# Patient Record
Sex: FEMALE | Race: OTHER | HISPANIC OR LATINO
[De-identification: names, ages, dates, MRNs, and addresses within clinical notes are randomized per-mention and may not be internally consistent; named-entity substitution may affect disease eponyms.]

---

## 2017-08-17 ENCOUNTER — LABORATORY RESULT (OUTPATIENT)
Age: 32
End: 2017-08-17

## 2017-08-17 ENCOUNTER — OUTPATIENT (OUTPATIENT)
Dept: OUTPATIENT SERVICES | Facility: HOSPITAL | Age: 32
LOS: 1 days | End: 2017-08-17

## 2017-08-17 ENCOUNTER — APPOINTMENT (OUTPATIENT)
Dept: INTERNAL MEDICINE | Facility: HOSPITAL | Age: 32
End: 2017-08-17

## 2017-08-17 VITALS
SYSTOLIC BLOOD PRESSURE: 126 MMHG | WEIGHT: 186 LBS | BODY MASS INDEX: 36.52 KG/M2 | HEIGHT: 60 IN | DIASTOLIC BLOOD PRESSURE: 75 MMHG | HEART RATE: 78 BPM

## 2017-08-17 DIAGNOSIS — N92.1 EXCESSIVE AND FREQUENT MENSTRUATION WITH IRREGULAR CYCLE: ICD-10-CM

## 2017-08-17 DIAGNOSIS — Z87.898 PERSONAL HISTORY OF OTHER SPECIFIED CONDITIONS: ICD-10-CM

## 2017-08-17 DIAGNOSIS — D50.0 IRON DEFICIENCY ANEMIA SECONDARY TO BLOOD LOSS (CHRONIC): ICD-10-CM

## 2017-08-17 DIAGNOSIS — N80.0 ENDOMETRIOSIS OF UTERUS: ICD-10-CM

## 2017-08-17 DIAGNOSIS — N39.8 OTHER SPECIFIED DISORDERS OF URINARY SYSTEM: ICD-10-CM

## 2017-08-17 DIAGNOSIS — K59.00 CONSTIPATION, UNSPECIFIED: ICD-10-CM

## 2017-08-17 DIAGNOSIS — N39.3 STRESS INCONTINENCE (FEMALE) (MALE): ICD-10-CM

## 2017-08-17 DIAGNOSIS — Z01.419 ENCOUNTER FOR GYNECOLOGICAL EXAMINATION (GENERAL) (ROUTINE) W/OUT ABNORMAL FINDINGS: ICD-10-CM

## 2017-08-17 DIAGNOSIS — F41.9 ANXIETY DISORDER, UNSPECIFIED: ICD-10-CM

## 2017-08-17 DIAGNOSIS — Z87.440 PERSONAL HISTORY OF URINARY (TRACT) INFECTIONS: ICD-10-CM

## 2017-08-17 DIAGNOSIS — E66.9 OBESITY, UNSPECIFIED: ICD-10-CM

## 2017-08-17 LAB
ALBUMIN SERPL ELPH-MCNC: 4.3 G/DL — SIGNIFICANT CHANGE UP (ref 3.3–5)
ALP SERPL-CCNC: 49 U/L — SIGNIFICANT CHANGE UP (ref 40–120)
ALT FLD-CCNC: 16 U/L — SIGNIFICANT CHANGE UP (ref 4–33)
AST SERPL-CCNC: 14 U/L — SIGNIFICANT CHANGE UP (ref 4–32)
BASOPHILS # BLD AUTO: 0.05 K/UL — SIGNIFICANT CHANGE UP (ref 0–0.2)
BASOPHILS NFR BLD AUTO: 0.9 % — SIGNIFICANT CHANGE UP (ref 0–2)
BILIRUB SERPL-MCNC: < 0.2 MG/DL — LOW (ref 0.2–1.2)
BUN SERPL-MCNC: 20 MG/DL — SIGNIFICANT CHANGE UP (ref 7–23)
CALCIUM SERPL-MCNC: 8.9 MG/DL — SIGNIFICANT CHANGE UP (ref 8.4–10.5)
CHLORIDE SERPL-SCNC: 105 MMOL/L — SIGNIFICANT CHANGE UP (ref 98–107)
CHOLEST SERPL-MCNC: 191 MG/DL — SIGNIFICANT CHANGE UP (ref 120–199)
CO2 SERPL-SCNC: 24 MMOL/L — SIGNIFICANT CHANGE UP (ref 22–31)
CREAT SERPL-MCNC: 0.87 MG/DL — SIGNIFICANT CHANGE UP (ref 0.5–1.3)
EOSINOPHIL # BLD AUTO: 0.13 K/UL — SIGNIFICANT CHANGE UP (ref 0–0.5)
EOSINOPHIL NFR BLD AUTO: 2.4 % — SIGNIFICANT CHANGE UP (ref 0–6)
FERRITIN SERPL-MCNC: 6.09 NG/ML — LOW (ref 15–150)
GLUCOSE SERPL-MCNC: 96 MG/DL — SIGNIFICANT CHANGE UP (ref 70–99)
HBA1C BLD-MCNC: 6.1 % — HIGH (ref 4–5.6)
HCT VFR BLD CALC: 35.3 % — SIGNIFICANT CHANGE UP (ref 34.5–45)
HDLC SERPL-MCNC: 56 MG/DL — SIGNIFICANT CHANGE UP (ref 45–65)
HGB BLD-MCNC: 11.2 G/DL — LOW (ref 11.5–15.5)
IMM GRANULOCYTES # BLD AUTO: 0.03 # — SIGNIFICANT CHANGE UP
IMM GRANULOCYTES NFR BLD AUTO: 0.5 % — SIGNIFICANT CHANGE UP (ref 0–1.5)
LIPID PNL WITH DIRECT LDL SERPL: 127 MG/DL — SIGNIFICANT CHANGE UP
LYMPHOCYTES # BLD AUTO: 2.36 K/UL — SIGNIFICANT CHANGE UP (ref 1–3.3)
LYMPHOCYTES # BLD AUTO: 43.2 % — SIGNIFICANT CHANGE UP (ref 13–44)
MCHC RBC-ENTMCNC: 25.5 PG — LOW (ref 27–34)
MCHC RBC-ENTMCNC: 31.7 % — LOW (ref 32–36)
MCV RBC AUTO: 80.2 FL — SIGNIFICANT CHANGE UP (ref 80–100)
MONOCYTES # BLD AUTO: 0.66 K/UL — SIGNIFICANT CHANGE UP (ref 0–0.9)
MONOCYTES NFR BLD AUTO: 12.1 % — SIGNIFICANT CHANGE UP (ref 2–14)
NEUTROPHILS # BLD AUTO: 2.23 K/UL — SIGNIFICANT CHANGE UP (ref 1.8–7.4)
NEUTROPHILS NFR BLD AUTO: 40.9 % — LOW (ref 43–77)
NRBC # FLD: 0 — SIGNIFICANT CHANGE UP
PLATELET # BLD AUTO: 368 K/UL — SIGNIFICANT CHANGE UP (ref 150–400)
PMV BLD: 9.6 FL — SIGNIFICANT CHANGE UP (ref 7–13)
POTASSIUM SERPL-MCNC: 4.6 MMOL/L — SIGNIFICANT CHANGE UP (ref 3.5–5.3)
POTASSIUM SERPL-SCNC: 4.6 MMOL/L — SIGNIFICANT CHANGE UP (ref 3.5–5.3)
PROT SERPL-MCNC: 7.2 G/DL — SIGNIFICANT CHANGE UP (ref 6–8.3)
RBC # BLD: 4.4 M/UL — SIGNIFICANT CHANGE UP (ref 3.8–5.2)
RBC # FLD: 15.8 % — HIGH (ref 10.3–14.5)
SODIUM SERPL-SCNC: 142 MMOL/L — SIGNIFICANT CHANGE UP (ref 135–145)
TRIGL SERPL-MCNC: 90 MG/DL — SIGNIFICANT CHANGE UP (ref 10–149)
WBC # BLD: 5.46 K/UL — SIGNIFICANT CHANGE UP (ref 3.8–10.5)
WBC # FLD AUTO: 5.46 K/UL — SIGNIFICANT CHANGE UP (ref 3.8–10.5)

## 2017-08-18 DIAGNOSIS — N39.41 URGE INCONTINENCE: ICD-10-CM

## 2017-08-18 DIAGNOSIS — E66.9 OBESITY, UNSPECIFIED: ICD-10-CM

## 2017-08-18 DIAGNOSIS — F41.9 ANXIETY DISORDER, UNSPECIFIED: ICD-10-CM

## 2017-08-18 DIAGNOSIS — N80.0 ENDOMETRIOSIS OF UTERUS: ICD-10-CM

## 2017-08-18 DIAGNOSIS — N92.1 EXCESSIVE AND FREQUENT MENSTRUATION WITH IRREGULAR CYCLE: ICD-10-CM

## 2017-08-18 DIAGNOSIS — D50.0 IRON DEFICIENCY ANEMIA SECONDARY TO BLOOD LOSS (CHRONIC): ICD-10-CM

## 2017-08-18 DIAGNOSIS — K59.00 CONSTIPATION, UNSPECIFIED: ICD-10-CM

## 2017-09-07 ENCOUNTER — APPOINTMENT (OUTPATIENT)
Dept: UROGYNECOLOGY | Facility: CLINIC | Age: 32
End: 2017-09-07

## 2019-04-01 ENCOUNTER — OUTPATIENT (OUTPATIENT)
Dept: OUTPATIENT SERVICES | Facility: HOSPITAL | Age: 34
LOS: 1 days | End: 2019-04-01
Payer: MEDICAID

## 2019-04-01 PROCEDURE — G9001: CPT

## 2019-04-15 DIAGNOSIS — Z71.89 OTHER SPECIFIED COUNSELING: ICD-10-CM

## 2019-04-17 ENCOUNTER — APPOINTMENT (OUTPATIENT)
Dept: UROGYNECOLOGY | Facility: CLINIC | Age: 34
End: 2019-04-17
Payer: MEDICAID

## 2019-04-17 DIAGNOSIS — R39.13 SPLITTING OF URINARY STREAM: ICD-10-CM

## 2019-04-17 DIAGNOSIS — N39.46 MIXED INCONTINENCE: ICD-10-CM

## 2019-04-17 DIAGNOSIS — R35.0 FREQUENCY OF MICTURITION: ICD-10-CM

## 2019-04-17 PROCEDURE — 51725 SIMPLE CYSTOMETROGRAM: CPT

## 2019-04-17 PROCEDURE — 51741 ELECTRO-UROFLOWMETRY FIRST: CPT

## 2019-04-17 PROCEDURE — 99214 OFFICE O/P EST MOD 30 MIN: CPT | Mod: 25

## 2019-04-17 NOTE — HISTORY OF PRESENT ILLNESS
[FreeTextEntry1] : 33 year old s/p c/sectiontriplets 9 years ago, [place] , s/p RP sling with Dr Junior with mixed incontinence, voiding dysfunction, and  prolapse.  Desires reconstructive surgery.

## 2019-04-17 NOTE — ASSESSMENT
[FreeTextEntry1] : 33 year old s/p c/section triplets 9 years ago, [place] , s/p RP sling with Dr Junior with mixed incontinence, voiding dysfunction, and  prolapse.  Desires reconstructive surgery.\par \par Sigificant mixed incontinence.  UDS indicated with void dysfunction and three failed incontinence procedures.\par \par counseled regarding surgical options.\par \par Patient presents for preoperative counseling and decision for surgery. We reviewed management options for her prolapse including: observation, pelvic floor exercises, pessary, surgical management. We reviewed various surgical management options including vaginal, laparoscopic/robotic, abdominal procedures. We also reviewed both mesh and non-mesh options. She continues to be interested in abdominal supracervical hysterectomy with retention of cervix and ovaries\par \par uds, cysto, preop pensing. sacral colpopexy. We discussed her urodynamics findings regarding incontinence, potential incontinence and risk and benefits of a sling procedure as well as other options.  We reviewed the data related to the C.A.R.E. study as it pertains to education about an incontinence procedure in conjunction with her prolapse procedure.  \par \par She   desires midurethral sling at the time of her prolapse repair but may choose to stage procedure given 3 previous repairs. \par \par She denies a history of abnormal Pap smears in the past and a recent Pap smear is negative. She is aware that her cervix will be left in situ and she will require continued routine screening. \par \par  She would like to proceed with a bilateral salpingectomy at the time of the procedure. we reviewed risks of the surgery including but not limited to: bleeding, infection, pain, urinary retention, persistence or worsening of stress urinary incontinence, development of urge incontinence, voiding dysfunction, mesh erosion, failure to reduce prolapse, prolapse recurrence, dyspareunia, and vaginal shortening. we discussed the risk of laparotomy (having to make an incision). We discussed the risk of injury to her bladder, ureters, urethra, vagina, rectum and bowel. We discussed the risk of mesh erosion, fistula formation and neuropathy. The risks and procedure details were explained in layman's terms and she expressed understanding of all of these risks. She understands that mesh will be used and we reviewed the FDA notification on transvaginal mesh. She understands that reoperation may be necessary for mesh related problems or non mesh related problems. \par \par We discussed the elective nature of the surgery and we discussed that she is choosing to undergo this surgery despite awareness and understanding of procedure risks. She was counseled on alternative treatment options. She continues to desire procedure. \par \par  We reviewed her hospital stay as well as preoperative and postoperative instructions. She is aware that she must be NPO after midnight prior to the surgery. \par \par

## 2019-04-19 ENCOUNTER — OUTPATIENT (OUTPATIENT)
Dept: OUTPATIENT SERVICES | Facility: HOSPITAL | Age: 34
LOS: 1 days | End: 2019-04-19
Payer: MEDICAID

## 2019-04-19 ENCOUNTER — APPOINTMENT (OUTPATIENT)
Dept: UROGYNECOLOGY | Facility: CLINIC | Age: 34
End: 2019-04-19
Payer: MEDICAID

## 2019-04-19 VITALS
HEIGHT: 60 IN | RESPIRATION RATE: 18 BRPM | TEMPERATURE: 99 F | OXYGEN SATURATION: 80 % | WEIGHT: 173.94 LBS | DIASTOLIC BLOOD PRESSURE: 69 MMHG | SYSTOLIC BLOOD PRESSURE: 103 MMHG | HEART RATE: 80 BPM

## 2019-04-19 DIAGNOSIS — N39.41 URGE INCONTINENCE: ICD-10-CM

## 2019-04-19 DIAGNOSIS — Z01.818 ENCOUNTER FOR OTHER PREPROCEDURAL EXAMINATION: ICD-10-CM

## 2019-04-19 DIAGNOSIS — N39.3 STRESS INCONTINENCE (FEMALE) (MALE): ICD-10-CM

## 2019-04-19 DIAGNOSIS — Z29.9 ENCOUNTER FOR PROPHYLACTIC MEASURES, UNSPECIFIED: ICD-10-CM

## 2019-04-19 DIAGNOSIS — N81.3 COMPLETE UTEROVAGINAL PROLAPSE: ICD-10-CM

## 2019-04-19 DIAGNOSIS — N81.2 INCOMPLETE UTEROVAGINAL PROLAPSE: ICD-10-CM

## 2019-04-19 DIAGNOSIS — R35.0 FREQUENCY OF MICTURITION: ICD-10-CM

## 2019-04-19 DIAGNOSIS — N81.11 CYSTOCELE, MIDLINE: ICD-10-CM

## 2019-04-19 LAB
ANION GAP SERPL CALC-SCNC: 12 MMOL/L — SIGNIFICANT CHANGE UP (ref 5–17)
BLD GP AB SCN SERPL QL: NEGATIVE — SIGNIFICANT CHANGE UP
BUN SERPL-MCNC: 16 MG/DL — SIGNIFICANT CHANGE UP (ref 7–23)
CALCIUM SERPL-MCNC: 8.7 MG/DL — SIGNIFICANT CHANGE UP (ref 8.4–10.5)
CHLORIDE SERPL-SCNC: 108 MMOL/L — SIGNIFICANT CHANGE UP (ref 96–108)
CO2 SERPL-SCNC: 21 MMOL/L — LOW (ref 22–31)
CREAT SERPL-MCNC: 0.68 MG/DL — SIGNIFICANT CHANGE UP (ref 0.5–1.3)
GLUCOSE SERPL-MCNC: 81 MG/DL — SIGNIFICANT CHANGE UP (ref 70–99)
POTASSIUM SERPL-MCNC: 3.9 MMOL/L — SIGNIFICANT CHANGE UP (ref 3.5–5.3)
POTASSIUM SERPL-SCNC: 3.9 MMOL/L — SIGNIFICANT CHANGE UP (ref 3.5–5.3)
RH IG SCN BLD-IMP: POSITIVE — SIGNIFICANT CHANGE UP
SODIUM SERPL-SCNC: 141 MMOL/L — SIGNIFICANT CHANGE UP (ref 135–145)

## 2019-04-19 PROCEDURE — 80048 BASIC METABOLIC PNL TOTAL CA: CPT

## 2019-04-19 PROCEDURE — G0463: CPT

## 2019-04-19 PROCEDURE — 86901 BLOOD TYPING SEROLOGIC RH(D): CPT

## 2019-04-19 PROCEDURE — 85027 COMPLETE CBC AUTOMATED: CPT

## 2019-04-19 PROCEDURE — 51797 INTRAABDOMINAL PRESSURE TEST: CPT | Mod: 26

## 2019-04-19 PROCEDURE — 83036 HEMOGLOBIN GLYCOSYLATED A1C: CPT

## 2019-04-19 PROCEDURE — 86850 RBC ANTIBODY SCREEN: CPT

## 2019-04-19 PROCEDURE — 51729 CYSTOMETROGRAM W/VP&UP: CPT

## 2019-04-19 PROCEDURE — 51729 CYSTOMETROGRAM W/VP&UP: CPT | Mod: 26

## 2019-04-19 PROCEDURE — 51784 ANAL/URINARY MUSCLE STUDY: CPT | Mod: 26

## 2019-04-19 PROCEDURE — 51797 INTRAABDOMINAL PRESSURE TEST: CPT

## 2019-04-19 PROCEDURE — 86900 BLOOD TYPING SEROLOGIC ABO: CPT

## 2019-04-19 PROCEDURE — 51784 ANAL/URINARY MUSCLE STUDY: CPT

## 2019-04-19 RX ORDER — GENTAMICIN SULFATE 40 MG/ML
390 VIAL (ML) INJECTION ONCE
Qty: 0 | Refills: 0 | Status: COMPLETED | OUTPATIENT
Start: 2019-04-29 | End: 2019-04-29

## 2019-04-19 RX ORDER — SODIUM CHLORIDE 9 MG/ML
3 INJECTION INTRAMUSCULAR; INTRAVENOUS; SUBCUTANEOUS EVERY 8 HOURS
Qty: 0 | Refills: 0 | Status: DISCONTINUED | OUTPATIENT
Start: 2019-04-29 | End: 2019-04-29

## 2019-04-19 RX ORDER — LIDOCAINE HCL 20 MG/ML
0.2 VIAL (ML) INJECTION ONCE
Qty: 0 | Refills: 0 | Status: DISCONTINUED | OUTPATIENT
Start: 2019-04-29 | End: 2019-04-29

## 2019-04-19 RX ORDER — CHLORHEXIDINE GLUCONATE 213 G/1000ML
1 SOLUTION TOPICAL DAILY
Qty: 0 | Refills: 0 | Status: DISCONTINUED | OUTPATIENT
Start: 2019-04-29 | End: 2019-04-29

## 2019-04-19 NOTE — H&P PST ADULT - ASSESSMENT
CAPRINI SCORE [CLOT updated 18]    AGE RELATED RISK FACTORS                                                       MOBILITY RELATED FACTORS  [ ] Age 41-60 years                                            (1 Point)                    [ ] Bed rest                                                        (1 Point)  [ ] Age: 61-74 years                                           (2 Points)                  [ ] Plaster cast                                                   (2 Points)  [ ] Age= 75 years                                              (3 Points)                    [ ] Bed bound for more than 72 hours                 (2 Points)    DISEASE RELATED RISK FACTORS                                               GENDER SPECIFIC FACTORS  [ ] Edema in the lower extremities                       (1 Point)              [ ] Pregnancy                                                     (1 Point)  [ ] Varicose veins                                               (1 Point)                     [ ] Post-partum < 6 weeks                                   (1 Point)             [x ] BMI > 25 Kg/m2                                            (1 Point)                     [ ] Hormonal therapy  or oral contraception          (1 Point)                 [ ] Sepsis (in the previous month)                        (1 Point)               [ ] History of pregnancy complications                 (1 point)  [ ] Pneumonia or serious lung disease                                               [ ] Unexplained or recurrent                     (1 Point)           (in the previous month)                               (1 Point)  [ ] Abnormal pulmonary function test                     (1 Point)                 SURGERY RELATED RISK FACTORS  [ ] Acute myocardial infarction                              (1 Point)               [ ]  Section                                             (1 Point)  [ ] Congestive heart failure (in the previous month)  (1 Point)      [ ] Minor surgery                                                  (1 Point)   [ ] Inflammatory bowel disease                             (1 Point)               [ ] Arthroscopic surgery                                        (2 Points)  [ ] Central venous access                                      (2 Points)                [ x] General surgery lasting more than 45 minutes (2 points)  [ ] Present or previous malignancy                     (2 Points)                [ ] Elective arthroplasty                                         (5 points)    [ ] Stroke (in the previous month)                          (5 Points)                                                                                                                                                           HEMATOLOGY RELATED FACTORS                                                 TRAUMA RELATED RISK FACTORS  [ ] Prior episodes of VTE                                     (3 Points)                [ ] Fracture of the hip, pelvis, or leg                       (5 Points)  [ ] Positive family history for VTE                         (3 Points)             [ ] Acute spinal cord injury (in the previous month)  (5 Points)  [ ] Prothrombin 81355 A                                     (3 Points)               [ ] Paralysis  (less than 1 month)                             (5 Points)  [ ] Factor V Leiden                                             (3 Points)                  [ ] Multiple Trauma within 1 month                        (5 Points)  [ ] Lupus anticoagulants                                     (3 Points)                                                           [ ] Anticardiolipin antibodies                               (3 Points)                                                       [ ] High homocysteine in the blood                      (3 Points)                                             [ ] Other congenital or acquired thrombophilia      (3 Points)                                                [ ] Heparin induced thrombocytopenia                  (3 Points)                                     Total Score [ 3         ]

## 2019-04-19 NOTE — H&P PST ADULT - NSICDXPASTSURGICALHX_GEN_ALL_CORE_FT
PAST SURGICAL HISTORY:  C Section 12/9/09 with triplets    History of Appendectomy (ICD9 V45.89) 2007    History of Prior Ablation Treatment (ICD9 V15.29) 2007    Normal vaginal delivery 2001    S/P Sling Procedure as per pt, she had a "bladder sling" placed in [place], 2014. [facility]- 2012    S/P tubal ligation 3/2011

## 2019-04-19 NOTE — H&P PST ADULT - NSICDXPASTMEDICALHX_GEN_ALL_CORE_FT
PAST MEDICAL HISTORY:  Adenomyosis     h/o  Nephritis last hospitalization      (normal spontaneous vaginal delivery) x 2 - ,     Obesity (BMI 30-39.9)     Personal History of Cardiac Arrhythmia 2007 - s/p ablation    Stress incontinence, female

## 2019-04-19 NOTE — H&P PST ADULT - HISTORY OF PRESENT ILLNESS
33 year old  female , triplets () with h/o urinary incontinence since the birth of her first child (), s/p incontinence surgery x 3 (  and  in [facility] [place] and  at [facility] . Pt has been experiencing increased urinary frequency, abnormal vaginal bleeding with irregular menstrual cycle, vaginal bulging followed by GYN . Presents to PST for scheduled  Midurethral sling, Cystoscopy , Abdominal Sacral  Colpopexy, Supra Cervical Hysterectomy , Anterior Colporrhaphy on 2019.

## 2019-04-19 NOTE — H&P PST ADULT - NSANTHOSAYNRD_GEN_A_CORE
No. DEB screening performed.  STOP BANG Legend: 0-2 = LOW Risk; 3-4 = INTERMEDIATE Risk; 5-8 = HIGH Risk

## 2019-04-19 NOTE — H&P PST ADULT - NSICDXPROBLEM_GEN_ALL_CORE_FT
PROBLEM DIAGNOSES  Problem: Complete uterovaginal prolapse  Assessment and Plan:  Midurethral sling, Cystoscopy ,Abdominal Sacral  Colpopexy ,Supra Cervical Hysterectomy , Anterior Colporrhaphy on 4/29/2019.   Pre- Op instructions discussed   CBC,BMP,Type and Screen ,HgA1c sent      Problem: Prophylactic measure  Assessment and Plan: The Caprini score indicates this patient is at risk for a VTE event (score 3-5).  Most surgical patients in this group would benefit from pharmacologic prophylaxis.  The surgical team will determine the balance between VTE risk and bleeding risk

## 2019-04-19 NOTE — H&P PST ADULT - ATTENDING COMMENTS
Patient with advanced pelvic prolapse admitted for hysterectomy, abdominal sacral suspension, and bilateral removal of fallopian tubes.  She had had three previous incontinence procedures and although a degree of incontinence persists, we discussed the risks of scar tissue, retropubic adhesions, organ injury, fistula formation with additional bladder surgery and she elects to stage the procedure with regard to any incontinence procedure. She understands the incontinence may be exacerbated.  We discussed the option of a rehman procedure and she will allow discretion of possible performance of a Rehman if the retropubic space is deemed without significant adhesions Patient with advanced pelvic prolapse admitted for hysterectomy, abdominal sacral suspension, and bilateral removal of fallopian tubes.  She had had three previous incontinence procedures and although a degree of incontinence persists, we discussed the risks of scar tissue, retropubic adhesions, organ injury, fistula formation with additional bladder surgery and she elects to stage the procedure with regard to any incontinence procedure. She understands the incontinence may be exacerbated.  We discussed the option of a rehman procedure and she will allow discretion of possible performance of a Rehman if the retropubic space is deemed without significant adhesions. Risks and benefits reviewed.    She had a positive cutute 5 days ago and was treated 3 days with appropriate antibiotic

## 2019-04-20 LAB
HBA1C BLD-MCNC: 5.4 % — SIGNIFICANT CHANGE UP (ref 4–5.6)
HCT VFR BLD CALC: 34.6 % — SIGNIFICANT CHANGE UP (ref 34.5–45)
HGB BLD-MCNC: 10.4 G/DL — LOW (ref 11.5–15.5)
MCHC RBC-ENTMCNC: 24.1 PG — LOW (ref 27–34)
MCHC RBC-ENTMCNC: 30.1 GM/DL — LOW (ref 32–36)
MCV RBC AUTO: 80.1 FL — SIGNIFICANT CHANGE UP (ref 80–100)
PLATELET # BLD AUTO: 385 K/UL — SIGNIFICANT CHANGE UP (ref 150–400)
RBC # BLD: 4.32 M/UL — SIGNIFICANT CHANGE UP (ref 3.8–5.2)
RBC # FLD: 16.1 % — HIGH (ref 10.3–14.5)
WBC # BLD: 5.28 K/UL — SIGNIFICANT CHANGE UP (ref 3.8–10.5)
WBC # FLD AUTO: 5.28 K/UL — SIGNIFICANT CHANGE UP (ref 3.8–10.5)

## 2019-04-22 DIAGNOSIS — R35.0 FREQUENCY OF MICTURITION: ICD-10-CM

## 2019-04-22 DIAGNOSIS — N39.41 URGE INCONTINENCE: ICD-10-CM

## 2019-04-22 DIAGNOSIS — N39.3 STRESS INCONTINENCE (FEMALE) (MALE): ICD-10-CM

## 2019-04-23 ENCOUNTER — RESULT REVIEW (OUTPATIENT)
Age: 34
End: 2019-04-23

## 2019-04-23 LAB — CYTOLOGY CVX/VAG DOC THIN PREP: NORMAL

## 2019-04-25 ENCOUNTER — OUTPATIENT (OUTPATIENT)
Dept: OUTPATIENT SERVICES | Facility: HOSPITAL | Age: 34
LOS: 1 days | End: 2019-04-25

## 2019-04-25 ENCOUNTER — APPOINTMENT (OUTPATIENT)
Dept: UROGYNECOLOGY | Facility: CLINIC | Age: 34
End: 2019-04-25
Payer: MEDICAID

## 2019-04-25 VITALS
HEIGHT: 60 IN | DIASTOLIC BLOOD PRESSURE: 80 MMHG | TEMPERATURE: 97.6 F | BODY MASS INDEX: 36.52 KG/M2 | HEART RATE: 72 BPM | SYSTOLIC BLOOD PRESSURE: 122 MMHG | WEIGHT: 186 LBS

## 2019-04-25 PROCEDURE — 99214 OFFICE O/P EST MOD 30 MIN: CPT | Mod: 25

## 2019-04-25 PROCEDURE — 52000 CYSTOURETHROSCOPY: CPT

## 2019-04-25 NOTE — PROCEDURE
[Stress Incontinence] : stress incontinence [Straight Catheterization] : insertion of a straight catheter

## 2019-04-25 NOTE — HISTORY OF PRESENT ILLNESS
[FreeTextEntry1] : Dale is a 32 yo P5 who presents for follow-up of her prolapse and UI. She has an extensive history of pelvic reconstructive surgery including 3 prior incontinence procedures ([place] in [place]- ?type unknown?) and one retropubic sling by Dr. Junior in 2012. Of note, [place] she also reports "making her vagina smaller because it was too big." She did not bring operative reports for us to review today. \par \par PMH: AUB, Iron deficiency anemia, last Hct 34.6 on 4/20/19\par PSH: cardiac ablation, BTL, appendectomy, CS x1, 3 prior pelvic surgeries\par Allergies: Motrin and Advil and sulfas--> rash and swelling\par \par Pap- NILM 4/2019\par TVUS- 2016 RV uterus 8cm likely adenomyosis, nl ovaries

## 2019-04-25 NOTE — PHYSICAL EXAM
[Well Nourished] : ~L well nourished [Well developed] : well developed [Normal Appearance] : ~T the appearance of the neck was normal [Obese] : obese [Warm and Dry] : was warm and dry to touch [Normal Gait] : gait was normal [Labia Majora] : were normal [Labia Minora] : were normal [Pink Rugae] : pink rugae [No Bleeding] : there was no active vaginal bleeding [1] : 1 [Exam Deferred] : was deferred [Normal] : normal [Aa ____] : Aa [unfilled] [Ba ____] : Ba [unfilled] [C ____] : C [unfilled] [GH ____] : GH [unfilled] [TVL ____] : TVL  [unfilled] [PB ____] : PB [unfilled] [Bp ____] : Bp [unfilled] [Ap ____] : Ap [unfilled] [D ____] : D [unfilled] [Anxiety] : patient is not anxious [Cough] : no cough [No Edema] : ~T edema was present [Distended] : not distended [Tenderness] : ~T no ~M abdominal tenderness observed [Inguinal LAD] : no adenopathy was noted in the inguinal lymph nodes [FreeTextEntry4] : small vaginal introitus  [de-identified] : cervix hypermobile with valsalva

## 2019-04-25 NOTE — DISCUSSION/SUMMARY
[FreeTextEntry1] : Patient presents for preoperative counseling and decision for surgery. We reviewed management options for her prolapse including: observation, pelvic floor exercises, pessary, surgical management. We reviewed various surgical management options including vaginal, laparoscopic/robotic, abdominal procedures. We also reviewed both mesh and non-mesh options. She continues to be interested in abdominal supracervical hysterectomy with sacral colpopexy to address her prolapse and AUB symptoms. We discussed her urodynamics findings regarding incontinence, potential incontinence and risk and benefits of a sling procedure as well as other options. Given her UDS findings (VLPP 199cm at 750mL) and her history of 3 prior incontinence procedures we discussed the risks and benefits of a staged procedure. She understands that this surgery may worsen her leakage of urine and we reviewed the data related to the C.A.R.E. study as it pertains to education about an incontinence procedure in conjunction with her prolapse procedure. She again expressed desire for a staged procedure. \par \par She denies a history of abnormal Pap smears in the past and a recent Pap smear is negative. She is aware that her cervix will be left in situ and she will require continued routine screening. We discussed the risks and benefits of removing her fallopian tubes at the time of surgery and she desires a bilateral salpingectomy. \par \par We reviewed risks of the surgery including but not limited to: bleeding, infection, pain, urinary retention, persistence or worsening of stress urinary incontinence, development of urge incontinence, voiding dysfunction, mesh erosion, failure to reduce prolapse, prolapse recurrence, dyspareunia, and vaginal shortening. we discussed the risk of laparotomy (having to make an incision). We discussed the risk of injury to her bladder, ureters, urethra, vagina, rectum and bowel. We discussed the risk of mesh erosion, fistula formation and neuropathy. The risks and procedure details were explained in layman's terms and she expressed understanding of all of these risks. She understands that mesh will be used and we reviewed the FDA notification on transvaginal mesh. She understands that reoperation may be necessary for mesh related problems or non mesh related problems. \par \par We discussed the elective nature of the surgery and we discussed that she is choosing to undergo this surgery despite awareness and understanding of procedure risks. She was counseled on alternative treatment options. She continues to desire procedure. \par \par We reviewed her hospital stay as well as preoperative and postoperative instructions. Written instructions were also provided to the patient for review at home. She is aware that she must be NPO after midnight prior to the surgery. We discussed the possibility of going home with a catheter. Hospital stay, postoperative restrictions, and anesthesia were discussed. All questions were answered. \par \par Attending: I participated in exam and key portions of counselling and agree with documentation.  She understands incontinence may be improved or worse following procedure\par \par

## 2019-04-26 ENCOUNTER — MESSAGE (OUTPATIENT)
Age: 34
End: 2019-04-26

## 2019-04-28 ENCOUNTER — TRANSCRIPTION ENCOUNTER (OUTPATIENT)
Age: 34
End: 2019-04-28

## 2019-04-29 ENCOUNTER — INPATIENT (INPATIENT)
Facility: HOSPITAL | Age: 34
LOS: 1 days | Discharge: HOME | DRG: 743 | End: 2019-05-01
Attending: UROLOGY | Admitting: UROLOGY
Payer: MEDICAID

## 2019-04-29 ENCOUNTER — RESULT REVIEW (OUTPATIENT)
Age: 34
End: 2019-04-29

## 2019-04-29 ENCOUNTER — TRANSCRIPTION ENCOUNTER (OUTPATIENT)
Age: 34
End: 2019-04-29

## 2019-04-29 ENCOUNTER — APPOINTMENT (OUTPATIENT)
Dept: UROGYNECOLOGY | Facility: HOSPITAL | Age: 34
End: 2019-04-29
Payer: MEDICAID

## 2019-04-29 VITALS
HEIGHT: 60 IN | DIASTOLIC BLOOD PRESSURE: 77 MMHG | TEMPERATURE: 98 F | HEART RATE: 68 BPM | OXYGEN SATURATION: 100 % | SYSTOLIC BLOOD PRESSURE: 115 MMHG | WEIGHT: 173.94 LBS | RESPIRATION RATE: 17 BRPM

## 2019-04-29 DIAGNOSIS — N39.3 STRESS INCONTINENCE (FEMALE) (MALE): ICD-10-CM

## 2019-04-29 DIAGNOSIS — N81.11 CYSTOCELE, MIDLINE: ICD-10-CM

## 2019-04-29 LAB
BACTERIA UR CULT: ABNORMAL
GLUCOSE BLDC GLUCOMTR-MCNC: 78 MG/DL — SIGNIFICANT CHANGE UP (ref 70–99)

## 2019-04-29 PROCEDURE — 88302 TISSUE EXAM BY PATHOLOGIST: CPT | Mod: 26

## 2019-04-29 PROCEDURE — 57280 SUSPENSION OF VAGINA: CPT

## 2019-04-29 PROCEDURE — 58180 PARTIAL HYSTERECTOMY: CPT

## 2019-04-29 PROCEDURE — 88307 TISSUE EXAM BY PATHOLOGIST: CPT | Mod: 26

## 2019-04-29 RX ORDER — OXYCODONE HYDROCHLORIDE 5 MG/1
5 TABLET ORAL EVERY 4 HOURS
Qty: 0 | Refills: 0 | Status: DISCONTINUED | OUTPATIENT
Start: 2019-04-29 | End: 2019-04-30

## 2019-04-29 RX ORDER — SODIUM CHLORIDE 9 MG/ML
1000 INJECTION, SOLUTION INTRAVENOUS
Qty: 0 | Refills: 0 | Status: DISCONTINUED | OUTPATIENT
Start: 2019-04-29 | End: 2019-05-01

## 2019-04-29 RX ORDER — ONDANSETRON 8 MG/1
4 TABLET, FILM COATED ORAL ONCE
Qty: 0 | Refills: 0 | Status: COMPLETED | OUTPATIENT
Start: 2019-04-29 | End: 2019-04-29

## 2019-04-29 RX ORDER — ACETAMINOPHEN 500 MG
1000 TABLET ORAL ONCE
Qty: 0 | Refills: 0 | Status: COMPLETED | OUTPATIENT
Start: 2019-04-29 | End: 2019-04-29

## 2019-04-29 RX ORDER — MORPHINE SULFATE 50 MG/1
4 CAPSULE, EXTENDED RELEASE ORAL EVERY 4 HOURS
Qty: 0 | Refills: 0 | Status: DISCONTINUED | OUTPATIENT
Start: 2019-04-29 | End: 2019-04-30

## 2019-04-29 RX ORDER — INFLUENZA VIRUS VACCINE 15; 15; 15; 15 UG/.5ML; UG/.5ML; UG/.5ML; UG/.5ML
0.5 SUSPENSION INTRAMUSCULAR ONCE
Qty: 0 | Refills: 0 | Status: DISCONTINUED | OUTPATIENT
Start: 2019-04-29 | End: 2019-05-01

## 2019-04-29 RX ORDER — HYDROMORPHONE HYDROCHLORIDE 2 MG/ML
0.5 INJECTION INTRAMUSCULAR; INTRAVENOUS; SUBCUTANEOUS
Qty: 0 | Refills: 0 | Status: DISCONTINUED | OUTPATIENT
Start: 2019-04-29 | End: 2019-04-29

## 2019-04-29 RX ORDER — VANCOMYCIN HCL 1 G
1000 VIAL (EA) INTRAVENOUS ONCE
Qty: 0 | Refills: 0 | Status: COMPLETED | OUTPATIENT
Start: 2019-04-29 | End: 2019-04-29

## 2019-04-29 RX ORDER — FAMOTIDINE 10 MG/ML
20 INJECTION INTRAVENOUS ONCE
Qty: 0 | Refills: 0 | Status: COMPLETED | OUTPATIENT
Start: 2019-04-29 | End: 2019-04-29

## 2019-04-29 RX ORDER — SODIUM CHLORIDE 9 MG/ML
1000 INJECTION, SOLUTION INTRAVENOUS
Qty: 0 | Refills: 0 | Status: DISCONTINUED | OUTPATIENT
Start: 2019-04-29 | End: 2019-04-29

## 2019-04-29 RX ORDER — OXYCODONE HYDROCHLORIDE 5 MG/1
10 TABLET ORAL EVERY 6 HOURS
Qty: 0 | Refills: 0 | Status: DISCONTINUED | OUTPATIENT
Start: 2019-04-29 | End: 2019-04-30

## 2019-04-29 RX ORDER — ACETAMINOPHEN 500 MG
1000 TABLET ORAL ONCE
Qty: 0 | Refills: 0 | Status: COMPLETED | OUTPATIENT
Start: 2019-04-30 | End: 2019-04-30

## 2019-04-29 RX ADMIN — Medication 400 MILLIGRAM(S): at 18:12

## 2019-04-29 RX ADMIN — FAMOTIDINE 20 MILLIGRAM(S): 10 INJECTION INTRAVENOUS at 06:21

## 2019-04-29 RX ADMIN — HYDROMORPHONE HYDROCHLORIDE 0.5 MILLIGRAM(S): 2 INJECTION INTRAMUSCULAR; INTRAVENOUS; SUBCUTANEOUS at 16:55

## 2019-04-29 RX ADMIN — SODIUM CHLORIDE 75 MILLILITER(S): 9 INJECTION, SOLUTION INTRAVENOUS at 13:59

## 2019-04-29 RX ADMIN — SODIUM CHLORIDE 3 MILLILITER(S): 9 INJECTION INTRAMUSCULAR; INTRAVENOUS; SUBCUTANEOUS at 15:05

## 2019-04-29 RX ADMIN — HYDROMORPHONE HYDROCHLORIDE 0.5 MILLIGRAM(S): 2 INJECTION INTRAMUSCULAR; INTRAVENOUS; SUBCUTANEOUS at 15:15

## 2019-04-29 RX ADMIN — CHLORHEXIDINE GLUCONATE 1 APPLICATION(S): 213 SOLUTION TOPICAL at 06:26

## 2019-04-29 RX ADMIN — Medication 1000 MILLIGRAM(S): at 18:37

## 2019-04-29 RX ADMIN — HYDROMORPHONE HYDROCHLORIDE 0.5 MILLIGRAM(S): 2 INJECTION INTRAMUSCULAR; INTRAVENOUS; SUBCUTANEOUS at 16:41

## 2019-04-29 RX ADMIN — HYDROMORPHONE HYDROCHLORIDE 0.5 MILLIGRAM(S): 2 INJECTION INTRAMUSCULAR; INTRAVENOUS; SUBCUTANEOUS at 15:00

## 2019-04-29 RX ADMIN — OXYCODONE HYDROCHLORIDE 10 MILLIGRAM(S): 5 TABLET ORAL at 22:44

## 2019-04-29 RX ADMIN — ONDANSETRON 4 MILLIGRAM(S): 8 TABLET, FILM COATED ORAL at 19:09

## 2019-04-29 RX ADMIN — OXYCODONE HYDROCHLORIDE 10 MILLIGRAM(S): 5 TABLET ORAL at 23:00

## 2019-04-29 NOTE — PROGRESS NOTE ADULT - SUBJECTIVE AND OBJECTIVE BOX
POST-OP CHECK    Allergies  ASA, PCN, SULFA, MOTRIN (Unknown)  aspirin (Angioedema; Rash)  Motrin (Angioedema)  penicillin (Rash; Anaphylaxis)  sulfa drugs (Pruritus; Rash)    Intolerances  Denies      S: Pt awake and alert resting c/o IV site pain. Patient due for pain medications. Pt denies N/V, SOB, CP, palpitations.    O:   HR: 66 (19 @ 15:30) (65 - 85)  BP: 125/70 (19 @ 15:30) (100/63 - 125/70)  RR: 18 (19 @ 15:30) (12 - 18)  SpO2: 96% (19 @ 15:30) (92% - 100%)    I&O's Summary    2019 07:  -  2019 16:42  --------------------------------------------------------  IN: 400 mL / OUT: 125 mL / NET: 275 mL        Gen:  CV: S1S2, RRR  Lungs: CTA B/L  Abd: soft, appropriately tender, occassional BS x 4 quadrants  Inc: Clean/dry/iintact  : thomas in place  Ext: PAS in place and functioning, Neg Homans B/L. no swelling, redness noted    A/P: 33y Female S/P DUSTIN, abdominal sacral colpopexy,  with PMHx of:  Adenomyosis  Obesity (BMI 30-39.9)   (normal spontaneous vaginal delivery): x 2 - ,   Stress incontinence, female  h/o  Nephritis: last hospitalization   Personal History of Cardiac Arrhythmia:  - s/p ablation  Normal vaginal delivery:   S/P Sling Procedure: as per pt, she had a &quot;bladder sling&quot; placed in [facility] [place] , . [facility]-   S/P tubal ligation: 3/2011  C Section: 09 with triplets  History of Prior Ablation Treatment (ICD9 V15.29):   History of Appendectomy (ICD9 V45.89):       -Continue routine care  -Analgesia PRN  -Pain management: oxycodone for moderate/severe pain, morphine for breakthrough pain  -[id number]'s/hr  -AM martha CHAND to remain to gravity  -AM CBC    Sharri Hassan PA-C

## 2019-04-29 NOTE — DISCHARGE NOTE PROVIDER - NSDCCPCAREPLAN_GEN_ALL_CORE_FT
PRINCIPAL DISCHARGE DIAGNOSIS  Diagnosis: Postoperative state  Assessment and Plan of Treatment: -s/p supracervical hysterectomy, abd sacrocolpoplexy  -stable condition, d/c home with f/u with Dr. Driscoll      SECONDARY DISCHARGE DIAGNOSES  Diagnosis: Complete uterovaginal prolapse  Assessment and Plan of Treatment:     Diagnosis: S/P abdominal supracervical subtotal hysterectomy  Assessment and Plan of Treatment:

## 2019-04-29 NOTE — DISCHARGE NOTE PROVIDER - HOSPITAL COURSE
Patient admitted for scheduled surgery. On 4/29 pt had abd sacrocolpopexy and supracervical hysterectomy. Operative note can be found in the chart. Hct: 34.6->    On POD#0 patient was transitioned to a regular diet and ambulated without difficulty. On POD#1 patient passed her TOV and was hemodynamically stable. Patient was discharged home on POD#1 with close follow up with Dr. Driscoll. Patient admitted for scheduled surgery. On 4/29 pt had abd sacrocolpopexy and supracervical hysterectomy. Operative note can be found in the chart. Hct: 34.6->29.8->26.9->27.8        On POD#0 patient was transitioned to a regular diet and ambulated without difficulty. On POD#2 patient passed her TOV and was hemodynamically stable. Patient was discharged home on POD#1 with close follow up with Dr. Driscoll. Patient admitted for scheduled surgery. On 4/29 pt had abd sacrocolpopexy and supracervical hysterectomy. Operative note can be found in the chart. Hct: 34.6->29.8->26.9->27.8        On POD#0 patient was transitioned to a regular diet and ambulated without difficulty. On POD#2 patient had abdominal pain and trouble passing flatus so abdominal xray was done and found to have normal gas pattern. Patient advised to tale dulcolax and simethicone.  On POD#2 patient passed her TOV and was hemodynamically stable. Patient was discharged home on POD#2 with close follow up with Dr. Driscoll.

## 2019-04-29 NOTE — DISCHARGE NOTE PROVIDER - CARE PROVIDER_API CALL
Wang Driscoll (MD)  Female Pelvic MedReconst Surg; Obstetrics and Gynecology  [address]  [place] [address]  Phone: [phone]  Fax: [phone]  Follow Up Time:

## 2019-04-30 DIAGNOSIS — Z98.890 OTHER SPECIFIED POSTPROCEDURAL STATES: ICD-10-CM

## 2019-04-30 LAB
BASOPHILS # BLD AUTO: 0 K/UL — SIGNIFICANT CHANGE UP (ref 0–0.2)
BASOPHILS NFR BLD AUTO: 0.4 % — SIGNIFICANT CHANGE UP (ref 0–2)
CULTURE RESULTS: NO GROWTH — SIGNIFICANT CHANGE UP
EOSINOPHIL # BLD AUTO: 0 K/UL — SIGNIFICANT CHANGE UP (ref 0–0.5)
EOSINOPHIL NFR BLD AUTO: 0.2 % — SIGNIFICANT CHANGE UP (ref 0–6)
HCT VFR BLD CALC: 29.8 % — LOW (ref 34.5–45)
HGB BLD-MCNC: 9.6 G/DL — LOW (ref 11.5–15.5)
LYMPHOCYTES # BLD AUTO: 18.6 % — SIGNIFICANT CHANGE UP (ref 13–44)
LYMPHOCYTES # BLD AUTO: 2.1 K/UL — SIGNIFICANT CHANGE UP (ref 1–3.3)
MCHC RBC-ENTMCNC: 24.9 PG — LOW (ref 27–34)
MCHC RBC-ENTMCNC: 32.3 GM/DL — SIGNIFICANT CHANGE UP (ref 32–36)
MCV RBC AUTO: 77.2 FL — LOW (ref 80–100)
MONOCYTES # BLD AUTO: 1.1 K/UL — HIGH (ref 0–0.9)
MONOCYTES NFR BLD AUTO: 10.2 % — SIGNIFICANT CHANGE UP (ref 2–14)
NEUTROPHILS # BLD AUTO: 7.9 K/UL — HIGH (ref 1.8–7.4)
NEUTROPHILS NFR BLD AUTO: 70.6 % — SIGNIFICANT CHANGE UP (ref 43–77)
PLATELET # BLD AUTO: 316 K/UL — SIGNIFICANT CHANGE UP (ref 150–400)
RBC # BLD: 3.86 M/UL — SIGNIFICANT CHANGE UP (ref 3.8–5.2)
RBC # FLD: 15.1 % — HIGH (ref 10.3–14.5)
SPECIMEN SOURCE: SIGNIFICANT CHANGE UP
WBC # BLD: 11.2 K/UL — HIGH (ref 3.8–10.5)
WBC # FLD AUTO: 11.2 K/UL — HIGH (ref 3.8–10.5)

## 2019-04-30 RX ORDER — SIMETHICONE 80 MG/1
80 TABLET, CHEWABLE ORAL EVERY 8 HOURS
Qty: 0 | Refills: 0 | Status: DISCONTINUED | OUTPATIENT
Start: 2019-04-30 | End: 2019-05-01

## 2019-04-30 RX ORDER — DOCUSATE SODIUM 100 MG
100 CAPSULE ORAL
Qty: 0 | Refills: 0 | Status: DISCONTINUED | OUTPATIENT
Start: 2019-04-30 | End: 2019-05-01

## 2019-04-30 RX ORDER — HYDROMORPHONE HYDROCHLORIDE 2 MG/ML
4 INJECTION INTRAMUSCULAR; INTRAVENOUS; SUBCUTANEOUS EVERY 4 HOURS
Qty: 0 | Refills: 0 | Status: DISCONTINUED | OUTPATIENT
Start: 2019-04-30 | End: 2019-05-01

## 2019-04-30 RX ORDER — ACETAMINOPHEN 500 MG
1000 TABLET ORAL ONCE
Qty: 0 | Refills: 0 | Status: COMPLETED | OUTPATIENT
Start: 2019-04-30 | End: 2019-04-30

## 2019-04-30 RX ORDER — HYDROMORPHONE HYDROCHLORIDE 2 MG/ML
2 INJECTION INTRAMUSCULAR; INTRAVENOUS; SUBCUTANEOUS EVERY 4 HOURS
Qty: 0 | Refills: 0 | Status: DISCONTINUED | OUTPATIENT
Start: 2019-04-30 | End: 2019-05-01

## 2019-04-30 RX ORDER — OXYCODONE HYDROCHLORIDE 5 MG/1
5 TABLET ORAL ONCE
Qty: 0 | Refills: 0 | Status: DISCONTINUED | OUTPATIENT
Start: 2019-04-30 | End: 2019-04-30

## 2019-04-30 RX ORDER — OXYCODONE HYDROCHLORIDE 5 MG/1
10 TABLET ORAL EVERY 4 HOURS
Qty: 0 | Refills: 0 | Status: DISCONTINUED | OUTPATIENT
Start: 2019-04-30 | End: 2019-04-30

## 2019-04-30 RX ORDER — OXYCODONE HYDROCHLORIDE 5 MG/1
5 TABLET ORAL EVERY 4 HOURS
Qty: 0 | Refills: 0 | Status: DISCONTINUED | OUTPATIENT
Start: 2019-04-30 | End: 2019-04-30

## 2019-04-30 RX ORDER — HYDROMORPHONE HYDROCHLORIDE 2 MG/ML
2 INJECTION INTRAMUSCULAR; INTRAVENOUS; SUBCUTANEOUS EVERY 6 HOURS
Qty: 0 | Refills: 0 | Status: DISCONTINUED | OUTPATIENT
Start: 2019-04-30 | End: 2019-04-30

## 2019-04-30 RX ORDER — METOCLOPRAMIDE HCL 10 MG
10 TABLET ORAL ONCE
Qty: 0 | Refills: 0 | Status: COMPLETED | OUTPATIENT
Start: 2019-04-30 | End: 2019-05-01

## 2019-04-30 RX ORDER — ONDANSETRON 8 MG/1
4 TABLET, FILM COATED ORAL ONCE
Qty: 0 | Refills: 0 | Status: COMPLETED | OUTPATIENT
Start: 2019-04-30 | End: 2019-04-30

## 2019-04-30 RX ORDER — ACETAMINOPHEN 500 MG
650 TABLET ORAL EVERY 6 HOURS
Qty: 0 | Refills: 0 | Status: DISCONTINUED | OUTPATIENT
Start: 2019-04-30 | End: 2019-05-01

## 2019-04-30 RX ADMIN — Medication 100 MILLIGRAM(S): at 17:44

## 2019-04-30 RX ADMIN — SIMETHICONE 80 MILLIGRAM(S): 80 TABLET, CHEWABLE ORAL at 17:44

## 2019-04-30 RX ADMIN — Medication 650 MILLIGRAM(S): at 23:40

## 2019-04-30 RX ADMIN — OXYCODONE HYDROCHLORIDE 5 MILLIGRAM(S): 5 TABLET ORAL at 11:05

## 2019-04-30 RX ADMIN — Medication 400 MILLIGRAM(S): at 00:41

## 2019-04-30 RX ADMIN — Medication 650 MILLIGRAM(S): at 16:39

## 2019-04-30 RX ADMIN — OXYCODONE HYDROCHLORIDE 10 MILLIGRAM(S): 5 TABLET ORAL at 05:51

## 2019-04-30 RX ADMIN — Medication 1000 MILLIGRAM(S): at 06:40

## 2019-04-30 RX ADMIN — OXYCODONE HYDROCHLORIDE 5 MILLIGRAM(S): 5 TABLET ORAL at 11:10

## 2019-04-30 RX ADMIN — HYDROMORPHONE HYDROCHLORIDE 2 MILLIGRAM(S): 2 INJECTION INTRAMUSCULAR; INTRAVENOUS; SUBCUTANEOUS at 20:41

## 2019-04-30 RX ADMIN — Medication 650 MILLIGRAM(S): at 23:06

## 2019-04-30 RX ADMIN — HYDROMORPHONE HYDROCHLORIDE 2 MILLIGRAM(S): 2 INJECTION INTRAMUSCULAR; INTRAVENOUS; SUBCUTANEOUS at 21:10

## 2019-04-30 RX ADMIN — OXYCODONE HYDROCHLORIDE 10 MILLIGRAM(S): 5 TABLET ORAL at 06:40

## 2019-04-30 RX ADMIN — ONDANSETRON 4 MILLIGRAM(S): 8 TABLET, FILM COATED ORAL at 16:06

## 2019-04-30 RX ADMIN — ONDANSETRON 4 MILLIGRAM(S): 8 TABLET, FILM COATED ORAL at 16:16

## 2019-04-30 RX ADMIN — Medication 1000 MILLIGRAM(S): at 00:55

## 2019-04-30 RX ADMIN — Medication 400 MILLIGRAM(S): at 05:52

## 2019-04-30 RX ADMIN — OXYCODONE HYDROCHLORIDE 5 MILLIGRAM(S): 5 TABLET ORAL at 17:35

## 2019-04-30 RX ADMIN — OXYCODONE HYDROCHLORIDE 5 MILLIGRAM(S): 5 TABLET ORAL at 12:00

## 2019-04-30 RX ADMIN — SODIUM CHLORIDE 75 MILLILITER(S): 9 INJECTION, SOLUTION INTRAVENOUS at 00:42

## 2019-04-30 RX ADMIN — Medication 650 MILLIGRAM(S): at 17:35

## 2019-04-30 RX ADMIN — OXYCODONE HYDROCHLORIDE 5 MILLIGRAM(S): 5 TABLET ORAL at 16:39

## 2019-04-30 RX ADMIN — OXYCODONE HYDROCHLORIDE 5 MILLIGRAM(S): 5 TABLET ORAL at 10:20

## 2019-04-30 NOTE — PROGRESS NOTE ADULT - ASSESSMENT
32 yo POD#1 s/p DUSTIN, abd sacral colpoplexy, and cystoscopy with inadequate pain control this AM. Patient is allergic to NSAIDs and is receiving oxycodone and morphine for breakthrough pain. Will re-assess pain this morning. 32 yo POD#1 s/p DUSTIN, abd sacral colpoplexy, and cystoscopy with inadequate pain control this AM. Patient is allergic to NSAIDs and is receiving oxycodone and morphine for breakthrough pain. Patient was given IN tylenol this AM, will re-assess pain later this morning. 32 yo POD#1 s/p DUSTIN, abd sacral colpoplexy, and cystoscopy with inadequate pain control this AM. Patient is allergic to NSAIDs and is receiving oxycodone and morphine for breakthrough pain. Patient was given IV tylenol this AM, will re-assess pain later this morning. 32 yo POD#1 s/p DUSTIN, abd sacral colpoplexy, and cystoscopy with inadequate pain control this AM. Patient is allergic to NSAIDs and is receiving oxycodone and morphine for breakthrough pain. Patient was given IV tylenol this AM, will re-assess abdominal pain later this morning. 32 yo POD#1 s/p DUSTIN, abd sacral colpoplexy, and cystoscopy with inadequate pain control this AM. Patient is allergic to NSAIDs and is receiving oxycodone and morphine for breakthrough pain. Patient was given IV tylenol and oxycodone this AM, will re-assess abdominal pain later this morning. 32 yo POD#1 s/p DUSTIN, abd sacral colpoplexy, and cystoscopy with inadequate pain control this AM. Patient is allergic to NSAIDs and is receiving oxycodone and morphine for breakthrough pain. Patient was given IV tylenol and oxycodone this AM, will re-assess abdominal pain later this morning.    Urogyn Fellow Note  Patient seen and examined at bedside. Pain improved with IV tylenol and oxycodone this AM. Arm pain improved. Tolerating reg diet. No flatus. No N/V.  34yo s/p abdominal DUSTIN/BS, SC and cystoscopy for prolapse. POD1.   - PO Tylenol. Will increase frequency of oxycodone.  - follow up AM CBC  - reg diet  - encourage ambulation  - If patient doing well, will perform TOV today    CADE Berger

## 2019-04-30 NOTE — PROGRESS NOTE ADULT - SUBJECTIVE AND OBJECTIVE BOX
Progress Note    Notified by RN that patient vomited x 1. Was dizzy earlier in the day but it has since subsided. Patient still noting that she in having lower abdominal pain, to the right and left of her incision.  Currently denying dizziness, headache, sob. Patient reports that she ate a muffin, hot chocolate, crackers earlier in the day without nausea. She has also been drinking juice throughout the day.    Allergies  aspirin (Angioedema; Rash)  Motrin (Angioedema)  penicillin (Rash; Anaphylaxis)  sulfa drugs (Pruritus; Rash)    O:   T(C): 36.4 (19 @ 16:11), Max: 37.5 (19 @ 13:15)  HR: 69 (19 @ 16:11) (67 - 82)  BP: 108/71 (19 @ 16:11) (99/63 - 108/71)  RR: 18 (19 @ 16:11) (16 - 18)  SpO2: 96% (19 @ 16:11) (96% - 96%)    Gen:  CV: S1S2, RRR  Lungs: CTA B/L  Abd: soft, appropriately tender, no rebound. occassional BS x 4 quadrants  Inc: Clean/dry/iintact  : thomas in place,  Ext: PAS in place and functioning, Neg Homans B/L. no swelling, redness noted    A/P: 33y Female S/P DUSTIN,m abdominal sacral colpopexy, cysto  with PMHx of:  Adenomyosis  Obesity (BMI 30-39.9)   (normal spontaneous vaginal delivery): x 2 - ,   Stress incontinence, female  h/o  Nephritis: last hospitalization   Personal History of Cardiac Arrhythmia:  - s/p ablation  Normal vaginal delivery:   S/P Sling Procedure: as per pt, she had a &quot;bladder sling&quot; placed in [facility] [place] , . [facility]-   S/P tubal ligation: 3/2011  C Section: 09 with triplets  History of Prior Ablation Treatment (ICD9 V15.29):   History of Appendectomy (ICD9 V45.89):       -Continue routine care  -Analgesia PRN  -OOB with assistance x 2, then Ad Michelle  -Repeat vitals  -Dr. Berger to assess patient    D/w Dr. Berger who d/w Dr. Americo Hassan PA-C Progress Note    Notified by RN that patient vomited x 1. Was dizzy earlier in the day but it has since subsided. Patient still noting that she in having lower abdominal pain, to the right and left of her incision.  Currently denying dizziness, headache, sob. Patient reports that she ate a muffin, hot chocolate, crackers earlier in the day without nausea. She has also been drinking juice throughout the day.    Allergies  aspirin (Angioedema; Rash)  Motrin (Angioedema)  penicillin (Rash; Anaphylaxis)  sulfa drugs (Pruritus; Rash)    O:   T(C): 36.4 (19 @ 16:11), Max: 37.5 (19 @ 13:15)  HR: 69 (19 @ 16:11) (67 - 82)  BP: 108/71 (19 @ 16:11) (99/63 - 108/71)  RR: 18 (19 @ 16:11) (16 - 18)  SpO2: 96% (19 @ 16:11) (96% - 96%)    Last Vitals at bedside:  BP: 108/71  HR: 69  O2 Sat: 96%  RR: 18    Gen:  CV: S1S2, RRR  Lungs: CTA B/L  Abd: soft, appropriately tender, no rebound. occassional BS x 4 quadrants  Inc: Clean/dry/iintact  : thomas in place,  Ext: PAS in place and functioning, Neg Homans B/L. no swelling, redness noted    A/P: 33y Female S/P DUSTIN,m abdominal sacral colpopexy, cysto  with PMHx of:  Adenomyosis  Obesity (BMI 30-39.9)   (normal spontaneous vaginal delivery): x 2 - ,   Stress incontinence, female  h/o  Nephritis: last hospitalization   Personal History of Cardiac Arrhythmia:  - s/p ablation  Normal vaginal delivery:   S/P Sling Procedure: as per pt, she had a &quot;bladder sling&quot; placed in [facility] [place] , . [facility]- 2012  S/P tubal ligation: 3/2011  C Section: 09 with triplets  History of Prior Ablation Treatment (ICD9 V15.29):   History of Appendectomy (ICD9 V45.89):       -Continue routine care  -Analgesia PRN  -OOB with assistance x 2, then Ad Michelle  -Repeat vitals  -Dr. Berger to assess patient    D/w Dr. Berger who d/w Dr. Americo Hassan, PA-C

## 2019-04-30 NOTE — PROVIDER CONTACT NOTE (OTHER) - RECOMMENDATIONS
To come and evaluate pt and PA and HO came and evaluated pt and ordered extra dose of Zofran
monitor pt.
Hot and ice packs offered.

## 2019-04-30 NOTE — PROGRESS NOTE ADULT - PROBLEM SELECTOR PLAN 1
Neuro: c/w oxycodone and morphine as needed  CV: Hemodynamically stable, f/u AM CBC  Pulm: Saturating well on room air, encourage incentive spirometry  GI: Advance to regular diet  : UOP adequate, d/c thomas this AM after TOV  Heme: c/w SCDs for DVT ppx  Dispo: Continue routine post-op care    MARIFER Garcia PGY1  [id number] Neuro: c/w IV tylenol, oxycodone and morphine as needed  CV: Hemodynamically stable, f/u AM CBC  Pulm: Saturating well on room air, encourage incentive spirometry  GI: Advance to regular diet  : UOP adequate, d/c martha this AM after TOV  Heme: c/w SCDs for DVT ppx  Dispo: Continue routine post-op care    MARIFER Garcia PGY1  [id number] Neuro: c/w IV tylenol, oxycodone and morphine as needed  CV: Hemodynamically stable, f/u AM CBC  Pulm: Saturating well on room air, encourage incentive spirometry  GI:  Regular diet  : UOP adequate, d/c martha this AM for TOV  Heme: c/w SCDs for DVT ppx  Dispo: Continue routine post-op care    MARIFER Garcia PGY1  [id number]

## 2019-04-30 NOTE — PROVIDER CONTACT NOTE (OTHER) - ACTION/TREATMENT ORDERED:
MD Santiago made aware. MD made aware that IV tylenol relieved some of the pain, but not all as per pt. MD stated she will be rounded on in AM. Will continue to monitor.

## 2019-04-30 NOTE — PROGRESS NOTE ADULT - SUBJECTIVE AND OBJECTIVE BOX
Patient seen and examined at bedside. Patient is complaining of abdominal pain, mostly around incision. Patient is receiving oxycodone for moderate pain and received morphine for severe pain. Patient also had arm pain overnight which was due to infiltrated IV, this was removed and switched. Patient has not yet ambulated and tolerating clears. Has not yet passed flatus. Brownlee is still in place. Denies CP, SOB, N/V, fevers, and chills.    Vital Signs Last 24 Hours  T(C): 37.2 (04-30-19 @ 05:52), Max: 37.6 (04-30-19 @ 00:47)  HR: 66 (04-30-19 @ 05:52) (61 - 87)  BP: 111/77 (04-30-19 @ 05:52) (100/59 - 125/70)  RR: 18 (04-30-19 @ 05:52) (12 - 18)  SpO2: 98% (04-30-19 @ 05:52) (92% - 100%)    I&O's Summary    29 Apr 2019 07:01  -  30 Apr 2019 06:42  --------------------------------------------------------  IN: 775 mL / OUT: 1985 mL / NET: -1210 mL        Physical Exam:  General: NAD  CV: NR, RR, S1, S2, no M/R/G  Lungs: CTA-B  Abdomen: Soft, mildly distended, diffusely tender, +BS. Binder in place.  Incision: CDI, dressing removed, steri-strips intact.   Ext: No pain or swelling    Labs:      MEDICATIONS  (STANDING):  influenza   Vaccine 0.5 milliLiter(s) IntraMuscular once  lactated ringers. 1000 milliLiter(s) (75 mL/Hr) IV Continuous <Continuous>    MEDICATIONS  (PRN):  morphine  - Injectable 4 milliGRAM(s) IV Push every 4 hours PRN Severe Pain (7 - 10)  oxyCODONE    IR 5 milliGRAM(s) Oral every 4 hours PRN Moderate Pain (4 - 6)  oxyCODONE    IR 10 milliGRAM(s) Oral every 6 hours PRN Severe Pain (7 - 10) Patient seen and examined at bedside. Patient is complaining of abdominal pain, mostly around incision. Patient is receiving oxycodone for moderate pain and received morphine for severe pain. Patient also had arm pain overnight which was due to infiltrated IV, this was removed and switched. Patient has not yet ambulated and tolerating clears. Has not yet passed flatus. Brownlee is still in place. Denies CP, SOB, N/V, fevers, and chills.    Vital Signs Last 24 Hours  T(C): 37.2 (04-30-19 @ 05:52), Max: 37.6 (04-30-19 @ 00:47)  HR: 66 (04-30-19 @ 05:52) (61 - 87)  BP: 111/77 (04-30-19 @ 05:52) (100/59 - 125/70)  RR: 18 (04-30-19 @ 05:52) (12 - 18)  SpO2: 98% (04-30-19 @ 05:52) (92% - 100%)    I&O's Summary    29 Apr 2019 07:01  -  30 Apr 2019 06:42  --------------------------------------------------------  IN: 775 mL / OUT: 1985 mL / NET: -1210 mL    Physical Exam:  General: NAD  CV: NR, RR, S1, S2, no M/R/G  Lungs: CTA-B  Abdomen: Soft, mildly distended, appropriately tender, +BS. Binder in place.  Incision: CDI, dressing removed, steri-strips intact.   Ext: No pain or swelling    Labs:      MEDICATIONS  (STANDING):  influenza   Vaccine 0.5 milliLiter(s) IntraMuscular once  lactated ringers. 1000 milliLiter(s) (75 mL/Hr) IV Continuous <Continuous>    MEDICATIONS  (PRN):  morphine  - Injectable 4 milliGRAM(s) IV Push every 4 hours PRN Severe Pain (7 - 10)  oxyCODONE    IR 5 milliGRAM(s) Oral every 4 hours PRN Moderate Pain (4 - 6)  oxyCODONE    IR 10 milliGRAM(s) Oral every 6 hours PRN Severe Pain (7 - 10) Patient seen and examined at bedside. Patient is complaining of abdominal pain, mostly around incision. Patient is receiving oxycodone for moderate pain and received morphine for severe pain. Patient also had arm pain overnight which was due to infiltrated IV, this was removed and switched. Arm pain improved this AM. Patient has not yet ambulated and tolerating clears. Has not yet passed flatus. Brownlee is still in place. Denies CP, SOB, N/V, fevers, and chills.    Vital Signs Last 24 Hours  T(C): 37.2 (04-30-19 @ 05:52), Max: 37.6 (04-30-19 @ 00:47)  HR: 66 (04-30-19 @ 05:52) (61 - 87)  BP: 111/77 (04-30-19 @ 05:52) (100/59 - 125/70)  RR: 18 (04-30-19 @ 05:52) (12 - 18)  SpO2: 98% (04-30-19 @ 05:52) (92% - 100%)    I&O's Summary    29 Apr 2019 07:01  -  30 Apr 2019 06:42  --------------------------------------------------------  IN: 775 mL / OUT: 1985 mL / NET: -1210 mL    Physical Exam:  General: NAD  CV: NR, RR, S1, S2, no M/R/G  Lungs: CTA-B  Abdomen: Soft, mildly distended, appropriately tender, +BS. Binder in place.  Incision: CDI, dressing removed, steri-strips intact.   Ext: No pain or swelling    Labs:      MEDICATIONS  (STANDING):  influenza   Vaccine 0.5 milliLiter(s) IntraMuscular once  lactated ringers. 1000 milliLiter(s) (75 mL/Hr) IV Continuous <Continuous>    MEDICATIONS  (PRN):  morphine  - Injectable 4 milliGRAM(s) IV Push every 4 hours PRN Severe Pain (7 - 10)  oxyCODONE    IR 5 milliGRAM(s) Oral every 4 hours PRN Moderate Pain (4 - 6)  oxyCODONE    IR 10 milliGRAM(s) Oral every 6 hours PRN Severe Pain (7 - 10)

## 2019-04-30 NOTE — PROVIDER CONTACT NOTE (OTHER) - ASSESSMENT
Pt awake and alert, skin warm and dry, acyanotic, pale. +bs, lungs clear bilaterally
Pt met at bedside, awake and alert, VSS. Skin warm and dry, acyanotic, pale. Lungs clear bilaterally. No vaginal bleeding noted. PT denies chest discomfort.
IV on right arm removed due to pt c/o pain above site. No swelling or redness. Pt able to lift arm, but not bend.

## 2019-05-01 ENCOUNTER — TRANSCRIPTION ENCOUNTER (OUTPATIENT)
Age: 34
End: 2019-05-01

## 2019-05-01 VITALS
TEMPERATURE: 99 F | SYSTOLIC BLOOD PRESSURE: 110 MMHG | OXYGEN SATURATION: 99 % | RESPIRATION RATE: 18 BRPM | DIASTOLIC BLOOD PRESSURE: 68 MMHG | HEART RATE: 61 BPM

## 2019-05-01 LAB
HCT VFR BLD CALC: 26.9 % — LOW (ref 34.5–45)
HCT VFR BLD CALC: 27.8 % — LOW (ref 34.5–45)
HGB BLD-MCNC: 8.9 G/DL — LOW (ref 11.5–15.5)
HGB BLD-MCNC: 9.1 G/DL — LOW (ref 11.5–15.5)
MCHC RBC-ENTMCNC: 25.9 PG — LOW (ref 27–34)
MCHC RBC-ENTMCNC: 33.2 GM/DL — SIGNIFICANT CHANGE UP (ref 32–36)
MCV RBC AUTO: 77.9 FL — LOW (ref 80–100)
PLATELET # BLD AUTO: 298 K/UL — SIGNIFICANT CHANGE UP (ref 150–400)
RBC # BLD: 3.45 M/UL — LOW (ref 3.8–5.2)
RBC # FLD: 14.9 % — HIGH (ref 10.3–14.5)
WBC # BLD: 10.1 K/UL — SIGNIFICANT CHANGE UP (ref 3.8–10.5)
WBC # FLD AUTO: 10.1 K/UL — SIGNIFICANT CHANGE UP (ref 3.8–10.5)

## 2019-05-01 PROCEDURE — 74019 RADEX ABDOMEN 2 VIEWS: CPT

## 2019-05-01 PROCEDURE — 82962 GLUCOSE BLOOD TEST: CPT

## 2019-05-01 PROCEDURE — 85027 COMPLETE CBC AUTOMATED: CPT

## 2019-05-01 PROCEDURE — 88307 TISSUE EXAM BY PATHOLOGIST: CPT

## 2019-05-01 PROCEDURE — 88302 TISSUE EXAM BY PATHOLOGIST: CPT

## 2019-05-01 PROCEDURE — C1781: CPT

## 2019-05-01 PROCEDURE — 85014 HEMATOCRIT: CPT

## 2019-05-01 PROCEDURE — 87086 URINE CULTURE/COLONY COUNT: CPT

## 2019-05-01 PROCEDURE — 74019 RADEX ABDOMEN 2 VIEWS: CPT | Mod: 26

## 2019-05-01 PROCEDURE — 85018 HEMOGLOBIN: CPT

## 2019-05-01 RX ORDER — POLYETHYLENE GLYCOL 3350 17 G/17G
0 POWDER, FOR SOLUTION ORAL
Qty: 0 | Refills: 0 | COMMUNITY

## 2019-05-01 RX ORDER — HYDROMORPHONE HYDROCHLORIDE 2 MG/ML
1 INJECTION INTRAMUSCULAR; INTRAVENOUS; SUBCUTANEOUS
Qty: 20 | Refills: 0 | OUTPATIENT
Start: 2019-05-01

## 2019-05-01 RX ORDER — PSYLLIUM SEED (WITH DEXTROSE)
0 POWDER (GRAM) ORAL
Qty: 0 | Refills: 0 | COMMUNITY

## 2019-05-01 RX ORDER — ACETAMINOPHEN 500 MG
2 TABLET ORAL
Qty: 0 | Refills: 0 | COMMUNITY
Start: 2019-05-01

## 2019-05-01 RX ORDER — HYDROMORPHONE HYDROCHLORIDE 2 MG/ML
1 INJECTION INTRAMUSCULAR; INTRAVENOUS; SUBCUTANEOUS
Qty: 0 | Refills: 0 | COMMUNITY

## 2019-05-01 RX ORDER — SODIUM CHLORIDE 9 MG/ML
500 INJECTION, SOLUTION INTRAVENOUS ONCE
Qty: 0 | Refills: 0 | Status: COMPLETED | OUTPATIENT
Start: 2019-05-01 | End: 2019-05-01

## 2019-05-01 RX ORDER — POLYETHYLENE GLYCOL 3350 17 G/17G
17 POWDER, FOR SOLUTION ORAL ONCE
Qty: 0 | Refills: 0 | Status: COMPLETED | OUTPATIENT
Start: 2019-05-01 | End: 2019-05-01

## 2019-05-01 RX ADMIN — Medication 650 MILLIGRAM(S): at 04:45

## 2019-05-01 RX ADMIN — HYDROMORPHONE HYDROCHLORIDE 2 MILLIGRAM(S): 2 INJECTION INTRAMUSCULAR; INTRAVENOUS; SUBCUTANEOUS at 04:45

## 2019-05-01 RX ADMIN — Medication 650 MILLIGRAM(S): at 13:26

## 2019-05-01 RX ADMIN — POLYETHYLENE GLYCOL 3350 17 GRAM(S): 17 POWDER, FOR SOLUTION ORAL at 12:44

## 2019-05-01 RX ADMIN — SODIUM CHLORIDE 75 MILLILITER(S): 9 INJECTION, SOLUTION INTRAVENOUS at 02:35

## 2019-05-01 RX ADMIN — SODIUM CHLORIDE 1000 MILLILITER(S): 9 INJECTION, SOLUTION INTRAVENOUS at 12:44

## 2019-05-01 RX ADMIN — Medication 100 MILLIGRAM(S): at 18:23

## 2019-05-01 RX ADMIN — Medication 650 MILLIGRAM(S): at 19:56

## 2019-05-01 RX ADMIN — HYDROMORPHONE HYDROCHLORIDE 2 MILLIGRAM(S): 2 INJECTION INTRAMUSCULAR; INTRAVENOUS; SUBCUTANEOUS at 01:20

## 2019-05-01 RX ADMIN — Medication 10 MILLIGRAM(S): at 07:55

## 2019-05-01 RX ADMIN — SIMETHICONE 80 MILLIGRAM(S): 80 TABLET, CHEWABLE ORAL at 12:00

## 2019-05-01 RX ADMIN — HYDROMORPHONE HYDROCHLORIDE 2 MILLIGRAM(S): 2 INJECTION INTRAMUSCULAR; INTRAVENOUS; SUBCUTANEOUS at 00:47

## 2019-05-01 RX ADMIN — SIMETHICONE 80 MILLIGRAM(S): 80 TABLET, CHEWABLE ORAL at 00:49

## 2019-05-01 RX ADMIN — Medication 650 MILLIGRAM(S): at 05:15

## 2019-05-01 RX ADMIN — HYDROMORPHONE HYDROCHLORIDE 2 MILLIGRAM(S): 2 INJECTION INTRAMUSCULAR; INTRAVENOUS; SUBCUTANEOUS at 05:15

## 2019-05-01 RX ADMIN — Medication 650 MILLIGRAM(S): at 13:32

## 2019-05-01 RX ADMIN — Medication 100 MILLIGRAM(S): at 04:45

## 2019-05-01 NOTE — DISCHARGE NOTE NURSING/CASE MANAGEMENT/SOCIAL WORK - NSDCPNDISPN_GEN_ALL_CORE
Side effects of pain management treatment/Education provided on the pain management plan of care/Activities of daily living, including home environment that might     exacerbate pain or reduce effectiveness of the pain management plan of care as well as strategies to address these issues

## 2019-05-01 NOTE — PROGRESS NOTE ADULT - PROBLEM SELECTOR PLAN 1
Neuro: c/w IV tylenol, Dilaudid as needed  CV: Hemodynamically stable, f/u AM CBC, c/w IVF  Pulm: Saturating well on room air, encourage incentive spirometry  GI:  Regular diet, colace, anti-emetics prn, simethicone. If pain worsens this AM or patient is unable to tolerate PO consider post-op ileus  : UOP adequate, d/c thomas this AM for TOV  Heme: c/w SCDs for DVT ppx  Dispo: Continue routine post-op care    MARIFER Garcia PGY1  [id number] Neuro: c/w IV tylenol, Dilaudid as needed  CV: Hemodynamically stable, f/u AM CBC, c/w IVF  Pulm: Saturating well on room air, encourage incentive spirometry  GI:  Regular diet, colace due to opoid use, anti-emetics prn, simethicone  : UOP adequate, d/c thomas this AM for TOV  Heme: c/w SCDs for DVT ppx  Dispo: Continue routine post-op care    MARIFER Garcia PGY1  [id number] Neuro: c/w IV tylenol, Dilaudid as needed  CV: Hemodynamically stable, f/u AM CBC, c/w IVF  Pulm: Saturating well on room air, encourage incentive spirometry  GI:  Regular diet, colace due to opoid use, anti-emetics prn, simethicone  : UOP adequate, d/c thomas this AM for TOV  Heme: c/w SCDs for DVT ppx  Dispo: Continue routine post-op care    MARIFER Garcia PGY1  D/w OBI. Celine Urogyn fellow  [id number]

## 2019-05-01 NOTE — PROGRESS NOTE ADULT - ASSESSMENT
34 yo POD#2 s/p DUSTIN, abd sacral colpoplexy, and cystoscopy for prolapse. Patient is having difficulty ambulating due to pain and nausea. This has improved with change in medication. Will re-assess this morning. 34 yo POD#2 s/p DUSTIN, abd sacral colpoplexy, and cystoscopy for prolapse. Patient is having difficulty ambulating due to pain and nausea. This has improved with change in medication. Will re-assess this morning.     Urogyn Fellow  Patient seen and examined.   Pain mildly improved with Dilaudid however, feeling nauseous and dizzy. Has not ambulated. Started passing flatus this AM.   +S1S2  CTA nb/l  Abd soft and moderately distended with incision C/D/I with steri-strips.  SCDs in place. No pedal edema.   Hct: 34.9 (preop)--> 29 POD1 --> 26.9 POD2  32yo s/p DUSTIN/BS, SC and cysto for prolapse POD2  - R/o ileus vs. obstruction-- abdominal xray ordered  - h/h trending down -- get h/h at 10a  - encourage ambulation  - encourage incentive spirometry  - continue PO pain meds    CADE Berger, DO

## 2019-05-01 NOTE — DISCHARGE NOTE NURSING/CASE MANAGEMENT/SOCIAL WORK - NSDCPNINST_GEN_ALL_CORE
Please notify your physician. Increased vaginal bleeding or large clots (saturating a pad an hour). Foul smelling vaginal discharge. Temperature greater than 100.0  F orally. Redness, swelling, yellow-green or bloody discharge from your incisions. Severe abdominal/vaginal and/or rectal pain.

## 2019-05-01 NOTE — DISCHARGE NOTE NURSING/CASE MANAGEMENT/SOCIAL WORK - NSDCDPATPORTLINK_GEN_ALL_CORE
You can access the [facility] Patient Portal, offered by [facility], by registering with the following website: [email or web address]

## 2019-05-01 NOTE — PROGRESS NOTE ADULT - SUBJECTIVE AND OBJECTIVE BOX
Patient seen and examined at bedside. Patient has had episodes of near syncope when attempting to ambulate. Patient does have some pain this morning which is improving. Was switched from oxycodone to Dilaudid overnight as she was having nausea+emesis with the medication. Received her most recent dose an hour ago. Patient has not ambulated overnight, has not passed gas, has thomas in place, is tolerating regular diet. Denies CP, SOB, N/V, fevers, and chills.    Vital Signs Last 24 Hours  T(C): 37 (05-01-19 @ 05:33), Max: 37.5 (04-30-19 @ 13:15)  HR: 74 (05-01-19 @ 05:33) (67 - 82)  BP: 123/85 (05-01-19 @ 05:33) (98/65 - 125/81)  RR: 18 (05-01-19 @ 05:33) (16 - 18)  SpO2: 98% (05-01-19 @ 05:33) (96% - 98%)    I&O's Detail    29 Apr 2019 07:01  -  30 Apr 2019 07:00  --------------------------------------------------------  IN:    IV PiggyBack: 100 mL    lactated ringers.: 675 mL  Total IN: 775 mL    OUT:    Indwelling Catheter - Urethral: 2785 mL  Total OUT: 2785 mL    Total NET: -2010 mL      30 Apr 2019 07:01  -  01 May 2019 06:27  --------------------------------------------------------  IN:    lactated ringers.: 825 mL    Oral Fluid: 600 mL  Total IN: 1425 mL    OUT:    Indwelling Catheter - Urethral: 3000 mL  Total OUT: 3000 mL    Total NET: -1575 mL    Physical Exam:  General: NAD  CV: NR, RR; S1, S2 nl  Lungs: CTA-B  Abdomen: Soft, appropriately tender, tympanic to percussion, hypoactive BS  Incision: c/d/i, steri-strips in place  Ext: No pain or swelling    Labs:             9.6<L>  11.2<H> )-----------( 316      ( 04-30 @ 07:25 )             29.8<L>    MEDICATIONS  (STANDING):  docusate sodium 100 milliGRAM(s) Oral two times a day  influenza   Vaccine 0.5 milliLiter(s) IntraMuscular once  lactated ringers. 1000 milliLiter(s) (75 mL/Hr) IV Continuous <Continuous>    MEDICATIONS  (PRN):  acetaminophen   Tablet .. 650 milliGRAM(s) Oral every 6 hours PRN Mild Pain (1 - 3)  HYDROmorphone   Tablet 2 milliGRAM(s) Oral every 4 hours PRN Moderate Pain (4 - 6)  HYDROmorphone   Tablet 4 milliGRAM(s) Oral every 4 hours PRN Severe Pain (7 - 10)  metoclopramide 10 milliGRAM(s) Oral once PRN Nausea  simethicone 80 milliGRAM(s) Chew every 8 hours PRN Gas Patient seen and examined at bedside. Patient has had episodes of near syncope when attempting to ambulate. Patient does have some pain this morning which is improving. Was switched from oxycodone to Dilaudid overnight as she was having nausea+emesis with the medication. Received her most recent dose an hour ago. Patient has not ambulated overnight, has not passed gas, has thomas in place, is tolerating regular diet. Denies CP, SOB, nausea currently, fevers, and chills.    Vital Signs Last 24 Hours  T(C): 37 (05-01-19 @ 05:33), Max: 37.5 (04-30-19 @ 13:15)  HR: 74 (05-01-19 @ 05:33) (67 - 82)  BP: 123/85 (05-01-19 @ 05:33) (98/65 - 125/81)  RR: 18 (05-01-19 @ 05:33) (16 - 18)  SpO2: 98% (05-01-19 @ 05:33) (96% - 98%)    I&O's Detail    29 Apr 2019 07:01  -  30 Apr 2019 07:00  --------------------------------------------------------  IN:    IV PiggyBack: 100 mL    lactated ringers.: 675 mL  Total IN: 775 mL    OUT:    Indwelling Catheter - Urethral: 2785 mL  Total OUT: 2785 mL    Total NET: -2010 mL      30 Apr 2019 07:01  -  01 May 2019 06:27  --------------------------------------------------------  IN:    lactated ringers.: 825 mL    Oral Fluid: 600 mL  Total IN: 1425 mL    OUT:    Indwelling Catheter - Urethral: 3000 mL  Total OUT: 3000 mL    Total NET: -1575 mL    Physical Exam:  General: NAD  CV: NR, RR; S1, S2 nl  Lungs: CTA-B  Abdomen: Soft, appropriately tender, tympanic to percussion, hypoactive BS  Incision: c/d/i, steri-strips in place  Ext: No pain or swelling    Labs:             9.6<L>  11.2<H> )-----------( 316      ( 04-30 @ 07:25 )             29.8<L>    MEDICATIONS  (STANDING):  docusate sodium 100 milliGRAM(s) Oral two times a day  influenza   Vaccine 0.5 milliLiter(s) IntraMuscular once  lactated ringers. 1000 milliLiter(s) (75 mL/Hr) IV Continuous <Continuous>    MEDICATIONS  (PRN):  acetaminophen   Tablet .. 650 milliGRAM(s) Oral every 6 hours PRN Mild Pain (1 - 3)  HYDROmorphone   Tablet 2 milliGRAM(s) Oral every 4 hours PRN Moderate Pain (4 - 6)  HYDROmorphone   Tablet 4 milliGRAM(s) Oral every 4 hours PRN Severe Pain (7 - 10)  metoclopramide 10 milliGRAM(s) Oral once PRN Nausea  simethicone 80 milliGRAM(s) Chew every 8 hours PRN Gas

## 2019-05-09 LAB — SURGICAL PATHOLOGY STUDY: SIGNIFICANT CHANGE UP

## 2019-05-13 ENCOUNTER — APPOINTMENT (OUTPATIENT)
Dept: UROGYNECOLOGY | Facility: CLINIC | Age: 34
End: 2019-05-13
Payer: MEDICAID

## 2019-05-13 DIAGNOSIS — N81.4 UTEROVAGINAL PROLAPSE, UNSPECIFIED: ICD-10-CM

## 2019-05-13 PROCEDURE — 99024 POSTOP FOLLOW-UP VISIT: CPT

## 2019-05-13 NOTE — DISCUSSION/SUMMARY
[Post-Op instructions given. Pt/family verbalizes understanding] : post-operative instructions were provided to the patient/family who verbalize understanding [FreeTextEntry1] : Discussed the possibility of skin irritation being from abdominal binder.  Advised no binder and no cream and f/u in office on Thursday as scheduled.  No evidence of infection.  Advised to remove steri strips in the shower at home.  Instructed to call with any questions or concerns and she verbalizes understanding.\par

## 2019-05-13 NOTE — OBJECTIVE
[Soft and Nontender] : soft and nontender [Good Support] : Good support [Clean, Dry, Intact] : Clean, Dry, Intact [No Masses or Tenderness] : no masses or tenderness [Healing well] : healing well [FreeTextEntry1] : discomfort with light touch of skin [FreeTextEntry2] : no s/s infection.  Steri strips intact- pt declines removal in office [FreeTextEntry3] : pt examined by Dr. Martinez

## 2019-05-13 NOTE — SUBJECTIVE
[FreeTextEntry1] : overall doing well.  C/o pain to touch to abdominal skin above incision site.  Has tried A&D and calamine with little relief.  Wears abdominal binder daily [FreeTextEntry7] : occasional, stopped dilaudid 2 days ago, now taking tylenol [FreeTextEntry8] : none new [FreeTextEntry5] : denies any dysuria, incomplete emptying or MICHELLE.  c/o mild discomfort with a full bladder [FreeTextEntry6] : normal [FreeTextEntry4] : some constipation- taking metamucil and miralax [FreeTextEntry3] : normal [FreeTextEntry2] : normal

## 2019-05-14 PROBLEM — N80.0 ENDOMETRIOSIS OF UTERUS: Chronic | Status: ACTIVE | Noted: 2019-04-19

## 2019-05-16 ENCOUNTER — APPOINTMENT (OUTPATIENT)
Dept: UROGYNECOLOGY | Facility: CLINIC | Age: 34
End: 2019-05-16
Payer: SELF-PAY

## 2019-05-16 DIAGNOSIS — M79.2 NEURALGIA AND NEURITIS, UNSPECIFIED: ICD-10-CM

## 2019-05-16 PROCEDURE — 99024 POSTOP FOLLOW-UP VISIT: CPT | Mod: GC

## 2019-05-23 NOTE — DISCUSSION/SUMMARY
[Post-Op instructions given. Pt/family verbalizes understanding] : post-operative instructions were provided to the patient/family who verbalize understanding [Risks/Benefits discussed. Pt/family verbalizes understanding] : risks and benefits of the procedure were discussed with the patient/family who verbalize understanding [FreeTextEntry1] : 34yo s/p ASCH, BS, SC, cysto. Postop pain most likely neuropathic pain. Will prescribe Gabapentin 300mg twice daily. Will return in 1 week for lidocaine injection or patch if not improved with gabapentin. Restrictions reviewed.

## 2019-05-23 NOTE — OBJECTIVE
[Clean, Dry, Intact] : Clean, Dry, Intact [Soft and Nontender] : soft and nontender [Good Support] : Good support [Healing well] : healing well [No Masses or Tenderness] : no masses or tenderness [FreeTextEntry2] : Incision appears non-erythematous or non-draining. Healing very well.  [FreeTextEntry3] : No mesh exposure

## 2019-05-23 NOTE — SUBJECTIVE
[FreeTextEntry1] : NAD [FreeTextEntry8] : Stopped taking Dilaudid because of constipation [FreeTextEntry7] : Experiencing tremendous skin pain around incision. Describes it as burning sensation on the left side of incision and numbness and tingling on the right side. Has tried creams, and lotions without improvement.  [FreeTextEntry6] : Normal [FreeTextEntry5] : Normal. [FreeTextEntry4] : Normal [FreeTextEntry3] : Normal [FreeTextEntry2] : Normal

## 2019-05-24 ENCOUNTER — APPOINTMENT (OUTPATIENT)
Dept: UROGYNECOLOGY | Facility: CLINIC | Age: 34
End: 2019-05-24
Payer: MEDICAID

## 2019-05-24 DIAGNOSIS — G89.18 OTHER ACUTE POSTPROCEDURAL PAIN: ICD-10-CM

## 2019-05-24 PROCEDURE — 20552 NJX 1/MLT TRIGGER POINT 1/2: CPT | Mod: 78

## 2019-05-24 NOTE — HISTORY OF PRESENT ILLNESS
[FreeTextEntry1] : Patient is s/p abdominal surgery for prolapse.  Incisional pain and sub dermal muscular pain in obliques and rectus muscles have been markedly higher than typical post operatively and has been refractory to ice, analgesia, and support with abdominal binder.  This pain is different than typical post surgical pain in that the surface has exquisite sensitivity.  there is no evidence of infection or erythema.\par \par In my opinion she would do well with neuropathic injection of local anesthetic / trigger point injections. \par \par The patient was counseled regarding risk and benefits. The lower abdomen over incision was prepped with betadine..5% marcaine was injected along the incision line into skin and two muscle groups in .5 cc doses along the full length.  Patient experience immediate decrease in pain.  We will see if this down-trains the neuropathic pain. \par \par She will continue with gabapentin 3 times daily.  \par \par This post operative incisional pain is beyond routine post operative findings and trigger point management would be considered in less than 5 percent of post operative patients. The  procedure is beyond routine post operative care.   Adverse reaction risks were reviewed.  She will return for follow up in 1 week.

## 2019-05-30 ENCOUNTER — APPOINTMENT (OUTPATIENT)
Dept: UROGYNECOLOGY | Facility: CLINIC | Age: 34
End: 2019-05-30
Payer: MEDICAID

## 2019-05-30 PROCEDURE — 99024 POSTOP FOLLOW-UP VISIT: CPT

## 2019-05-30 NOTE — DISCUSSION/SUMMARY
[FreeTextEntry1] : Pt reports 60% improvement in incisional pain after Marcaine injections were done in office. She would like to hold off on any further injections to see if pain will improve.  \par \par Regarding Gabapentin, she will take one dose daily x 1 week and stop thereafter.  She will take Tylenol as needed.  Gabapentin was causing patient severe drowsiness.  She was advised to take three doses daily but was only taking one or two doses daily.  She is also constipated. Last BM was 1 week ago.\par \par Regarding bowel regimen, she was advised to drink at least 5-6 cups water daily.  She will also take 4 capfuls of MiraLAX and mix in 1 quart of Gatorade and drink over 1-2 hours.  She will repeat the next day if no bowel movement.  After this she will start MiraLAX BID.  She was advised f/u with GI regarding her long history of constipation.\par \par Pt to RTO in 2 weeks for f/u of incision pain or sooner if needed. She agrees to call office if pain worsens while tapering Gabapentin.  Pt understands and agrees with plan.

## 2019-05-30 NOTE — SUBJECTIVE
[FreeTextEntry7] : pain above incision has improved 60% since last visit.  [FreeTextEntry5] : denies any incontinence or retention [FreeTextEntry4] : reports worsened constipation with Gabapentin. Reports h/o constipation but now symptoms are above baseline

## 2019-06-12 ENCOUNTER — MESSAGE (OUTPATIENT)
Age: 34
End: 2019-06-12

## 2019-07-11 ENCOUNTER — APPOINTMENT (OUTPATIENT)
Dept: UROGYNECOLOGY | Facility: CLINIC | Age: 34
End: 2019-07-11

## 2019-07-19 ENCOUNTER — APPOINTMENT (OUTPATIENT)
Dept: UROGYNECOLOGY | Facility: CLINIC | Age: 34
End: 2019-07-19
Payer: MEDICAID

## 2019-07-19 DIAGNOSIS — Z98.890 OTHER SPECIFIED POSTPROCEDURAL STATES: ICD-10-CM

## 2019-07-19 PROCEDURE — 99024 POSTOP FOLLOW-UP VISIT: CPT

## 2019-07-19 NOTE — SUBJECTIVE
[FreeTextEntry1] : NAD [FreeTextEntry8] : None [FreeTextEntry7] : Very minimal pain at incision site. No longer requiring medications.  [FreeTextEntry6] : Normal [FreeTextEntry5] : Reports urgency and urgency incontinence. Denies MICHELLE. She reports continuing to dink 2-3 cups of coffee daily.  [FreeTextEntry4] : Normal [FreeTextEntry3] : Normal [FreeTextEntry2] : Normal [FreeTextEntry9] : She noticed the right side of her mons is more portuberant than the other. There is no pain a/w with this. It doesn't get better or worse. Overall she is not bothered by this and is very happy with surgery.

## 2019-07-19 NOTE — OBJECTIVE
[Soft and Nontender] : soft and nontender [Clean, Dry, Intact] : Clean, Dry, Intact [Good Support] : Good support [Healing well] : healing well [No Masses or Tenderness] : no masses or tenderness [FreeTextEntry3] : No mesh exposure. right mons appear somewhat elevated. no signs of hernia, most likely uneveness with incisional healing.

## 2019-07-19 NOTE — DISCUSSION/SUMMARY
[FreeTextEntry1] : 32yo s/p ASCH, SC, and sling. She is doing well. She may resume normal activity. We discussed that mons appearance is cosmetic and no evidence of hernia. She may get consultation with plastic surgery. She is not bother by this however. We discussed fluid restriction to treat her OAB. She understands her symptoms are worsened w/ increased caffeine. She will return to our office for follow up as needed.

## 2019-10-03 DIAGNOSIS — Z01.818 ENCOUNTER FOR OTHER PREPROCEDURAL EXAMINATION: ICD-10-CM

## 2020-01-29 ENCOUNTER — OUTPATIENT (OUTPATIENT)
Dept: OUTPATIENT SERVICES | Facility: HOSPITAL | Age: 35
LOS: 1 days | End: 2020-01-29
Payer: MEDICAID

## 2020-01-29 ENCOUNTER — APPOINTMENT (OUTPATIENT)
Dept: PEDIATRIC ALLERGY IMMUNOLOGY | Facility: CLINIC | Age: 35
End: 2020-01-29
Payer: MEDICAID

## 2020-01-29 VITALS
SYSTOLIC BLOOD PRESSURE: 117 MMHG | DIASTOLIC BLOOD PRESSURE: 88 MMHG | BODY MASS INDEX: 31.95 KG/M2 | WEIGHT: 163.58 LBS | HEART RATE: 76 BPM

## 2020-01-29 DIAGNOSIS — J30.1 ALLERGIC RHINITIS DUE TO POLLEN: ICD-10-CM

## 2020-01-29 DIAGNOSIS — R51 HEADACHE: ICD-10-CM

## 2020-01-29 DIAGNOSIS — Z23 ENCOUNTER FOR IMMUNIZATION: ICD-10-CM

## 2020-01-29 PROCEDURE — G0463: CPT | Mod: 25

## 2020-01-29 PROCEDURE — 90471 IMMUNIZATION ADMIN: CPT

## 2020-01-29 PROCEDURE — 95004 PERQ TESTS W/ALRGNC XTRCS: CPT

## 2020-01-29 PROCEDURE — 99204 OFFICE O/P NEW MOD 45 MIN: CPT

## 2020-01-29 RX ORDER — CHLORHEXIDINE GLUCONATE 4 %
325 (65 FE) LIQUID (ML) TOPICAL DAILY
Qty: 90 | Refills: 1 | Status: COMPLETED | COMMUNITY
Start: 2017-08-18 | End: 2020-01-29

## 2020-01-29 RX ORDER — GABAPENTIN 300 MG/1
300 CAPSULE ORAL TWICE DAILY
Qty: 30 | Refills: 0 | Status: COMPLETED | COMMUNITY
Start: 2019-05-16 | End: 2020-01-29

## 2020-01-29 RX ORDER — CIDER VINEGAR 300 MG
1000 TABLET ORAL
Refills: 0 | Status: COMPLETED | COMMUNITY
Start: 2017-08-17 | End: 2020-01-29

## 2020-01-29 RX ORDER — CYANOCOBALAMIN (VITAMIN B-12) 1000 MCG
100 TABLET, EXTENDED RELEASE ORAL DAILY
Refills: 0 | Status: COMPLETED | COMMUNITY
Start: 2017-08-17 | End: 2020-01-29

## 2020-01-29 NOTE — PHYSICAL EXAM
[Alert] : alert [Well Nourished] : well nourished [Healthy Appearance] : healthy appearance [No Acute Distress] : no acute distress [No Discharge] : no discharge [No Photophobia] : no photophobia [Sclera Not Icteric] : sclera not icteric [Normal TMs] : both tympanic membranes were normal [Normal Lips/Tongue] : the lips and tongue were normal [Normal Outer Ear/Nose] : the ears and nose were normal in appearance [Normal Tonsils] : normal tonsils [No Thrush] : no thrush [No Oral Lesions or Ulcers] : no oral lesions or ulcers [Boggy Nasal Turbinates] : boggy and/or pale nasal turbinates [Posterior Pharyngeal Cobblestoning] : posterior pharyngeal cobblestoning [Clear Rhinorrhea] : clear rhinorrhea was seen [Supple] : the neck was supple [Normal Rate and Effort] : normal respiratory rhythm and effort [No Crackles] : no crackles [Bilateral Audible Breath Sounds] : bilateral audible breath sounds [Normal Rate] : heart rate was normal  [Normal S1, S2] : normal S1 and S2 [No murmur] : no murmur [Regular Rhythm] : with a regular rhythm [Soft] : abdomen soft [Not Tender] : non-tender [No HSM] : no hepato-splenomegaly [Normal Cervical Lymph Nodes] : cervical [Skin Intact] : skin intact  [No Rash] : no rash [No clubbing] : no clubbing [No Edema] : no edema [No Cyanosis] : no cyanosis [Normal Mood] : mood was normal [Normal Affect] : affect was normal [Alert, Awake, Oriented as Age-Appropriate] : alert, awake, oriented as age appropriate [Conjunctival Erythema] : no conjunctival erythema [Suborbital Bogginess] : no suborbital bogginess (allergic shiners) [Pharyngeal erythema] : no pharyngeal erythema [Exudate] : no exudate [Wheezing] : no wheezing was heard [Eczematous Patches] : no eczematous patches

## 2020-01-29 NOTE — REVIEW OF SYSTEMS
[Eye Discharge] : eye discharge [Rhinorrhea] : rhinorrhea [Nasal Congestion] : nasal congestion [Eye Itching] : itchy eyes [Sneezing] : sneezing [Headache] : headache [Nl] : Respiratory [Fever] : no fever [Fatigue] : no fatigue [Joint Pains] : no arthralgias [Urticaria] : no urticaria [Atopic Dermatitis] : no atopic dermatitis [Easy Bruising] : no tendency for easy bruising [Nosebleeds] : no epistaxis [Swelling] : no swelling [Recurrent Sinus Infections] : no recurrent sinus infections [Recurrent Bronchitis] : no recurrent bronchitis [Recurrent Ear Infections] : no recurrence or ear infections [Recurrent Throat Infections] : no recurrence of throat infections [Recurrent Skin Infections] : no recurrent skin infections [FreeTextEntry4] : spring time only [FreeTextEntry3] : seasonal, spring [Recurrent Pneumonia] : no ~T recurrent pneumonia [FreeTextEntry5] : h/o palpitations, s/p ablation

## 2020-01-29 NOTE — CONSULT LETTER
[Dear  ___] : Dear  [unfilled], [Please see my note below.] : Please see my note below. [Consult Letter:] : I had the pleasure of evaluating your patient, [unfilled]. [FreeTextEntry3] : Vidhi Camargo MD FAAROSELIAI, MADHURI\par Adult and Pediatric Allergy, Asthma and Clinical Immunology\par  of Medicine and Pediatrics at\par   [facility]\par Section Head, Adult Allergy and Immunology\par   [facility]\par   Division of Allergy, Asthma and Immunology\par   [address], [place] [address]\par   [place], [place] [address]\par   Phone [phone]  Fax [phone]\par \par \par  [Consult Closing:] : Thank you very much for allowing me to participate in the care of this patient.  If you have any questions, please do not hesitate to contact me. [Sincerely,] : Sincerely,

## 2020-01-29 NOTE — HISTORY OF PRESENT ILLNESS
[Eczematous rashes] : eczematous rashes [Asthma] : asthma [Venom Reactions] : venom reactions [Food Allergies] : food allergies [de-identified] : MENDOZA COOPER is a 34 year  old female, presents for allergy evaluation. \par \par DRUG ALLERGIES:\par Sulfa- at 20 years of age developed rashes after childbirth.\par Penicillin- took orally at 16 years of age when in [place] and developed rash.\par Aspirin- her mother gets swelling with aspirin and told MENDOZA  that when she was a child she had a reaction. Patient doesn't know any details. She tolerates Aleve.  She does not have a history of nasal polyps, asthma or urticaria and angioedema. \par \par She also gets nasal and ocular perennial allergic symptoms. She has tried nasal flonase and OTC claritin, which help. she has been getting headaches, behind her eyes, which occur daily sometimes. \par \par She is originally from [facility] [place]. She has 4 children, including triplets.

## 2020-01-29 NOTE — REASON FOR VISIT
[To Medication] : allergy to medication [Initial Consultation] : an initial consultation for [Congestion] : congestion

## 2020-01-31 DIAGNOSIS — T39.015A ADVERSE EFFECT OF ASPIRIN, INITIAL ENCOUNTER: ICD-10-CM

## 2020-01-31 DIAGNOSIS — T37.0X5A ADVERSE EFFECT OF SULFONAMIDES, INITIAL ENCOUNTER: ICD-10-CM

## 2020-01-31 DIAGNOSIS — Z23 ENCOUNTER FOR IMMUNIZATION: ICD-10-CM

## 2020-01-31 DIAGNOSIS — R51 HEADACHE: ICD-10-CM

## 2020-01-31 DIAGNOSIS — T36.0X5A ADVERSE EFFECT OF PENICILLINS, INITIAL ENCOUNTER: ICD-10-CM

## 2020-01-31 DIAGNOSIS — J30.1 ALLERGIC RHINITIS DUE TO POLLEN: ICD-10-CM

## 2020-03-02 ENCOUNTER — OUTPATIENT (OUTPATIENT)
Dept: OUTPATIENT SERVICES | Facility: HOSPITAL | Age: 35
LOS: 1 days | End: 2020-03-02
Payer: MEDICAID

## 2020-03-02 ENCOUNTER — APPOINTMENT (OUTPATIENT)
Dept: PEDIATRIC ALLERGY IMMUNOLOGY | Facility: CLINIC | Age: 35
End: 2020-03-02
Payer: MEDICAID

## 2020-03-02 VITALS
DIASTOLIC BLOOD PRESSURE: 80 MMHG | BODY MASS INDEX: 32.2 KG/M2 | OXYGEN SATURATION: 98 % | WEIGHT: 164 LBS | HEART RATE: 58 BPM | SYSTOLIC BLOOD PRESSURE: 117 MMHG | HEIGHT: 60 IN

## 2020-03-02 DIAGNOSIS — T39.015D: ICD-10-CM

## 2020-03-02 DIAGNOSIS — T37.0X5A ADVERSE EFFECT OF SULFONAMIDES, INITIAL ENCOUNTER: ICD-10-CM

## 2020-03-02 DIAGNOSIS — T36.0X5D ADVERSE EFFECT OF PENICILLINS, SUBSEQUENT ENCOUNTER: ICD-10-CM

## 2020-03-02 DIAGNOSIS — T36.0X5A ADVERSE EFFECT OF PENICILLINS, INITIAL ENCOUNTER: ICD-10-CM

## 2020-03-02 DIAGNOSIS — T39.015A ADVERSE EFFECT OF ASPIRIN, INITIAL ENCOUNTER: ICD-10-CM

## 2020-03-02 DIAGNOSIS — T37.0X5D ADVERSE EFFECT OF SULFONAMIDES, SUBSEQUENT ENCOUNTER: ICD-10-CM

## 2020-03-02 PROCEDURE — 95018 ALL TSTG PERQ&IQ DRUGS/BIOL: CPT

## 2020-03-02 PROCEDURE — G0463: CPT | Mod: 25

## 2020-03-02 PROCEDURE — 99214 OFFICE O/P EST MOD 30 MIN: CPT

## 2020-03-06 NOTE — CONSULT LETTER
[Dear  ___] : Dear  [unfilled], [Please see my note below.] : Please see my note below. [Consult Letter:] : I had the pleasure of evaluating your patient, [unfilled]. [Sincerely,] : Sincerely, [Consult Closing:] : Thank you very much for allowing me to participate in the care of this patient.  If you have any questions, please do not hesitate to contact me. [FreeTextEntry3] : Vidhi Camargo MD FAAROSELIAI, MADHURI\par Adult and Pediatric Allergy, Asthma and Clinical Immunology\par  of Medicine and Pediatrics at\par   [facility]\par Section Head, Adult Allergy and Immunology\par   [facility]\par   Division of Allergy, Asthma and Immunology\par   [address] [place] [address]\par   [place], [place] [address]\par   Phone [phone]  Fax [phone]\par \par \par

## 2020-03-06 NOTE — REVIEW OF SYSTEMS
[Eye Discharge] : eye discharge [Eye Itching] : itchy eyes [Rhinorrhea] : rhinorrhea [Sneezing] : sneezing [Nasal Congestion] : nasal congestion [Headache] : headache [Nl] : Integumentary [Fatigue] : no fatigue [Fever] : no fever [Nosebleeds] : no epistaxis [FreeTextEntry3] : seasonal, spring [FreeTextEntry4] : spring time only [FreeTextEntry5] : h/o palpitations, s/p ablation

## 2020-03-06 NOTE — HISTORY OF PRESENT ILLNESS
[Eczematous rashes] : eczematous rashes [Asthma] : asthma [Food Allergies] : food allergies [Venom Reactions] : venom reactions [de-identified] : MENDOZA COOPER is a 34 year  old female, with Allergic Rhinitis (ST 2020-tree pollen) and medication allergies, returns for penicillin testing. \par \par DRUG ALLERGIES:\par Sulfa- at 20 years of age developed rashes after childbirth.\par Penicillin- took orally at 16 years of age when in [place] and developed rash.\par Aspirin- her mother gets swelling with aspirin and told MENDOZA  that when she was a child she had a reaction. Patient doesn't know any details. She tolerates Aleve.  She does not have a history of nasal polyps, asthma or urticaria and angioedema. \par \par Allergic Rhinitis \par - ST 2020-tree pollen\par - She also gets nasal and ocular perennial allergic symptoms. She has tried nasal flonase and OTC claritin, which help. she has been getting headaches, behind her eyes, which occur daily sometimes. \par \par She is originally from [facility] [place]. She has 4 children, including triplets.

## 2020-03-06 NOTE — REASON FOR VISIT
[Routine Follow-Up] : a routine follow-up visit for [Allergy Evaluation/ Skin Testing] : allergy evaluation and or skin testing [To Medication] : allergy to medication [FreeTextEntry2] : PCN ST

## 2020-03-06 NOTE — IMPRESSION
[FreeTextEntry1] : Epicutaneous and intradermal skin tests were negative to Penicillin (10,000 U/mL), PrePen, Ampicillin with adequate controls.

## 2020-03-06 NOTE — PHYSICAL EXAM
[Well Nourished] : well nourished [Alert] : alert [No Acute Distress] : no acute distress [Healthy Appearance] : healthy appearance [No Discharge] : no discharge [No Photophobia] : no photophobia [Sclera Not Icteric] : sclera not icteric [Normal TMs] : both tympanic membranes were normal [Normal Outer Ear/Nose] : the ears and nose were normal in appearance [No Thrush] : no thrush [No Oral Lesions or Ulcers] : no oral lesions or ulcers [Boggy Nasal Turbinates] : boggy and/or pale nasal turbinates [Supple] : the neck was supple [Posterior Pharyngeal Cobblestoning] : posterior pharyngeal cobblestoning [Clear Rhinorrhea] : clear rhinorrhea was seen [Normal Rate and Effort] : normal respiratory rhythm and effort [No Crackles] : no crackles [Bilateral Audible Breath Sounds] : bilateral audible breath sounds [Normal Rate] : heart rate was normal  [Regular Rhythm] : with a regular rhythm [Soft] : abdomen soft [No Rash] : no rash [Skin Intact] : skin intact  [No Cyanosis] : no cyanosis [No Edema] : no edema [No clubbing] : no clubbing [Normal Mood] : mood was normal [Normal Affect] : affect was normal [Alert, Awake, Oriented as Age-Appropriate] : alert, awake, oriented as age appropriate [Conjunctival Erythema] : no conjunctival erythema [Suborbital Bogginess] : no suborbital bogginess (allergic shiners) [Pharyngeal erythema] : no pharyngeal erythema [Exudate] : no exudate [Wheezing] : no wheezing was heard [Eczematous Patches] : no eczematous patches

## 2020-03-09 DIAGNOSIS — T39.015D: ICD-10-CM

## 2020-03-09 DIAGNOSIS — T36.0X5D: ICD-10-CM

## 2020-03-09 DIAGNOSIS — T37.0X5D: ICD-10-CM

## 2020-03-16 ENCOUNTER — APPOINTMENT (OUTPATIENT)
Dept: PEDIATRIC ALLERGY IMMUNOLOGY | Facility: CLINIC | Age: 35
End: 2020-03-16

## 2020-09-24 ENCOUNTER — OUTPATIENT (OUTPATIENT)
Dept: OUTPATIENT SERVICES | Facility: HOSPITAL | Age: 35
LOS: 1 days | End: 2020-09-24
Payer: MEDICAID

## 2020-09-24 ENCOUNTER — APPOINTMENT (OUTPATIENT)
Dept: INTERNAL MEDICINE | Facility: CLINIC | Age: 35
End: 2020-09-24
Payer: MEDICAID

## 2020-09-24 VITALS
HEIGHT: 60 IN | SYSTOLIC BLOOD PRESSURE: 110 MMHG | WEIGHT: 181 LBS | DIASTOLIC BLOOD PRESSURE: 80 MMHG | BODY MASS INDEX: 35.53 KG/M2

## 2020-09-24 DIAGNOSIS — I10 ESSENTIAL (PRIMARY) HYPERTENSION: ICD-10-CM

## 2020-09-24 DIAGNOSIS — R53.83 OTHER FATIGUE: ICD-10-CM

## 2020-09-24 DIAGNOSIS — Z23 ENCOUNTER FOR IMMUNIZATION: ICD-10-CM

## 2020-09-24 PROCEDURE — 90471 IMMUNIZATION ADMIN: CPT

## 2020-09-24 PROCEDURE — 90715 TDAP VACCINE 7 YRS/> IM: CPT

## 2020-09-24 PROCEDURE — G0008: CPT

## 2020-09-24 PROCEDURE — 99204 OFFICE O/P NEW MOD 45 MIN: CPT | Mod: GC

## 2020-09-24 PROCEDURE — G0463: CPT | Mod: 25

## 2020-09-24 PROCEDURE — 90688 IIV4 VACCINE SPLT 0.5 ML IM: CPT

## 2020-09-24 NOTE — PHYSICAL EXAM
[No Acute Distress] : no acute distress [Well Nourished] : well nourished [Well Developed] : well developed [Well-Appearing] : well-appearing [Normal Sclera/Conjunctiva] : normal sclera/conjunctiva [PERRL] : pupils equal round and reactive to light [EOMI] : extraocular movements intact [Normal Outer Ear/Nose] : the outer ears and nose were normal in appearance [Normal Oropharynx] : the oropharynx was normal [No JVD] : no jugular venous distention [No Lymphadenopathy] : no lymphadenopathy [Supple] : supple [Thyroid Normal, No Nodules] : the thyroid was normal and there were no nodules present [No Respiratory Distress] : no respiratory distress  [No Accessory Muscle Use] : no accessory muscle use [Clear to Auscultation] : lungs were clear to auscultation bilaterally [Normal Rate] : normal rate  [Regular Rhythm] : with a regular rhythm [Normal S1, S2] : normal S1 and S2 [No Murmur] : no murmur heard [No Carotid Bruits] : no carotid bruits [Pedal Pulses Present] : the pedal pulses are present [No Edema] : there was no peripheral edema [No Extremity Clubbing/Cyanosis] : no extremity clubbing/cyanosis [Soft] : abdomen soft [Non Tender] : non-tender [Non-distended] : non-distended [No Masses] : no abdominal mass palpated [No HSM] : no HSM [Normal Bowel Sounds] : normal bowel sounds [Normal Posterior Cervical Nodes] : no posterior cervical lymphadenopathy [Normal Anterior Cervical Nodes] : no anterior cervical lymphadenopathy [No CVA Tenderness] : no CVA  tenderness [No Spinal Tenderness] : no spinal tenderness [No Joint Swelling] : no joint swelling [Grossly Normal Strength/Tone] : grossly normal strength/tone [No Rash] : no rash [Coordination Grossly Intact] : coordination grossly intact [No Focal Deficits] : no focal deficits [Normal Gait] : normal gait [Normal Affect] : the affect was normal [Normal Insight/Judgement] : insight and judgment were intact

## 2020-09-25 LAB
ALBUMIN SERPL ELPH-MCNC: 4.6 G/DL
ALP BLD-CCNC: 53 U/L
ALT SERPL-CCNC: 11 U/L
ANION GAP SERPL CALC-SCNC: 13 MMOL/L
AST SERPL-CCNC: 14 U/L
BASOPHILS # BLD AUTO: 0.06 K/UL
BASOPHILS NFR BLD AUTO: 1.2 %
BILIRUB SERPL-MCNC: 0.3 MG/DL
BUN SERPL-MCNC: 13 MG/DL
CALCIUM SERPL-MCNC: 9.3 MG/DL
CHLORIDE SERPL-SCNC: 102 MMOL/L
CHOLEST SERPL-MCNC: 194 MG/DL
CHOLEST/HDLC SERPL: 3.4 RATIO
CO2 SERPL-SCNC: 23 MMOL/L
CREAT SERPL-MCNC: 0.78 MG/DL
EOSINOPHIL # BLD AUTO: 0.13 K/UL
EOSINOPHIL NFR BLD AUTO: 2.5 %
ESTIMATED AVERAGE GLUCOSE: 117 MG/DL
FOLATE SERPL-MCNC: 7.3 NG/ML
GLUCOSE SERPL-MCNC: 89 MG/DL
HBA1C MFR BLD HPLC: 5.7 %
HCT VFR BLD CALC: 45.2 %
HDLC SERPL-MCNC: 57 MG/DL
HGB BLD-MCNC: 14.2 G/DL
IMM GRANULOCYTES NFR BLD AUTO: 0.6 %
LDLC SERPL CALC-MCNC: 124 MG/DL
LYMPHOCYTES # BLD AUTO: 2.51 K/UL
LYMPHOCYTES NFR BLD AUTO: 48.7 %
MAN DIFF?: NORMAL
MCHC RBC-ENTMCNC: 28.3 PG
MCHC RBC-ENTMCNC: 31.4 GM/DL
MCV RBC AUTO: 90 FL
MONOCYTES # BLD AUTO: 0.44 K/UL
MONOCYTES NFR BLD AUTO: 8.5 %
NEUTROPHILS # BLD AUTO: 1.98 K/UL
NEUTROPHILS NFR BLD AUTO: 38.5 %
PLATELET # BLD AUTO: 317 K/UL
POTASSIUM SERPL-SCNC: 4.4 MMOL/L
PROT SERPL-MCNC: 7.3 G/DL
RBC # BLD: 5.02 M/UL
RBC # FLD: 13.6 %
SODIUM SERPL-SCNC: 138 MMOL/L
TRIGL SERPL-MCNC: 67 MG/DL
TSH SERPL-ACNC: 1.32 UIU/ML
VIT B12 SERPL-MCNC: 810 PG/ML
WBC # FLD AUTO: 5.15 K/UL

## 2020-09-25 NOTE — HEALTH RISK ASSESSMENT
[0] : 1) Little interest or pleasure doing things: Not at all (0) [1] : 2) Feeling down, depressed, or hopeless for several days (1) [Fully functional (bathing, dressing, toileting, transferring, walking, feeding)] : Fully functional (bathing, dressing, toileting, transferring, walking, feeding) [Fully functional (using the telephone, shopping, preparing meals, housekeeping, doing laundry, using] : Fully functional and needs no help or supervision to perform IADLs (using the telephone, shopping, preparing meals, housekeeping, doing laundry, using transportation, managing medications and managing finances) [[id number]] : 1

## 2020-09-25 NOTE — REVIEW OF SYSTEMS
[Fever] : no fever [Night Sweats] : no night sweats [Discharge] : no discharge [Vision Problems] : no vision problems [Earache] : no earache [Nasal Discharge] : no nasal discharge [Chest Pain] : no chest pain [Orthopnea] : no orthopnea [Shortness Of Breath] : no shortness of breath [Wheezing] : no wheezing [Abdominal Pain] : no abdominal pain [Nausea] : no nausea [Vomiting] : no vomiting [Heartburn] : no heartburn [Dysuria] : no dysuria [Hematuria] : no hematuria [Joint Pain] : no joint pain [Muscle Pain] : no muscle pain [Itching] : no itching [Skin Rash] : no skin rash [Headache] : no headache [Memory Loss] : no memory loss [Anxiety] : no anxiety [Depression] : no depression [Easy Bleeding] : no easy bleeding [Easy Bruising] : no easy bruising

## 2020-09-25 NOTE — HISTORY OF PRESENT ILLNESS
[FreeTextEntry1] : Establishment of care [de-identified] : Mrs. Noel is a 34 y/o female w/ PMH significant for MIGUEL, Sulfa allergies/allergic rhinitis, AUB s/p abdominal supracervical hysterectomy and sacral colpopexy (2019), who presented to the clinic for establishment of care.\par \par She has been subjectively doing well without any acute illness. However, she has been feeling more tired since her operation last year. She reports having daytime somnolence and requiring frequent cups of coffee during the day to keep herself awake. She reports that she sleeps well and sometimes 10-12 hours, yet even after long hours sleep, she still feels unrested and tired. She states that she snores but not sure if she had any apneic event. She doesn't recall waking up at night time. For some reason,she cannot sleep on her back but rather sleeps on side. She also reports unintentional 20lbs weight gain for past couple of months and has constipation.\par \par Diet: she tries to eat healthy, yet as a mother of five children, she has hard time keeping up with healthy diet. For constipation, she tries to have high fiber diet, enough hydration and metamucil as needed.\par Exercise: since quarantine, she was unable to do much exercise. no scheduled exercise. \par SH: no smoking, no EtOH, no rec drug use. currently not sexually active.\par FH: mother - HTN, HLD, DM.\par

## 2021-01-30 NOTE — H&P PST ADULT - HEIGHT IN CM
Pt with nausea post eating breakfast and states \"I feel like I am going to throw up\"   For breakfast patient ate half a omelet and 4 oz apple juice. Emesis basin provided. Spoke with MD Miriam Grant during critical care rounds and new antinausea medications ordered.      Medicated at this time with PRN Compazine 152.4

## 2021-03-08 ENCOUNTER — NON-APPOINTMENT (OUTPATIENT)
Age: 36
End: 2021-03-08

## 2021-03-08 ENCOUNTER — APPOINTMENT (OUTPATIENT)
Dept: OPHTHALMOLOGY | Facility: CLINIC | Age: 36
End: 2021-03-08
Payer: MEDICAID

## 2021-03-08 PROCEDURE — 92004 COMPRE OPH EXAM NEW PT 1/>: CPT

## 2021-03-08 PROCEDURE — 99072 ADDL SUPL MATRL&STAF TM PHE: CPT

## 2021-07-13 ENCOUNTER — APPOINTMENT (OUTPATIENT)
Dept: OBGYN | Facility: CLINIC | Age: 36
End: 2021-07-13
Payer: MEDICAID

## 2021-07-13 ENCOUNTER — LABORATORY RESULT (OUTPATIENT)
Age: 36
End: 2021-07-13

## 2021-07-13 ENCOUNTER — OUTPATIENT (OUTPATIENT)
Dept: OUTPATIENT SERVICES | Facility: HOSPITAL | Age: 36
LOS: 1 days | End: 2021-07-13
Payer: MEDICAID

## 2021-07-13 VITALS
HEIGHT: 60 IN | WEIGHT: 159 LBS | BODY MASS INDEX: 31.22 KG/M2 | SYSTOLIC BLOOD PRESSURE: 100 MMHG | DIASTOLIC BLOOD PRESSURE: 88 MMHG

## 2021-07-13 DIAGNOSIS — Z01.419 ENCOUNTER FOR GYNECOLOGICAL EXAMINATION (GENERAL) (ROUTINE) W/OUT ABNORMAL FINDINGS: ICD-10-CM

## 2021-07-13 DIAGNOSIS — N76.0 ACUTE VAGINITIS: ICD-10-CM

## 2021-07-13 DIAGNOSIS — Z01.419 ENCOUNTER FOR GYNECOLOGICAL EXAMINATION (GENERAL) (ROUTINE) WITHOUT ABNORMAL FINDINGS: ICD-10-CM

## 2021-07-13 PROCEDURE — 87624 HPV HI-RISK TYP POOLED RSLT: CPT

## 2021-07-13 PROCEDURE — 87591 N.GONORRHOEAE DNA AMP PROB: CPT

## 2021-07-13 PROCEDURE — 87491 CHLMYD TRACH DNA AMP PROBE: CPT

## 2021-07-13 PROCEDURE — G0463: CPT

## 2021-07-13 PROCEDURE — 99395 PREV VISIT EST AGE 18-39: CPT

## 2021-07-13 NOTE — PHYSICAL EXAM
[Awake] : awake [Alert] : alert [Acute Distress] : no acute distress [Mass] : no breast mass [Nipple Discharge] : no nipple discharge [Axillary LAD] : no axillary lymphadenopathy [Soft] : soft [Tender] : non tender [Oriented x3] : oriented to person, place, and time [Normal] : uterus [No Bleeding] : there was no active vaginal bleeding [Absent] : was absent [Uterine Adnexae] : were not tender and not enlarged

## 2021-07-13 NOTE — HISTORY OF PRESENT ILLNESS
[Good] : being in good health [Reproductive Age] : is of reproductive age [de-identified] : 9/24/20 [unknown] : the patient is unsure of the date of her LMP

## 2021-07-14 LAB
C TRACH RRNA SPEC QL NAA+PROBE: SIGNIFICANT CHANGE UP
HPV HIGH+LOW RISK DNA PNL CVX: SIGNIFICANT CHANGE UP
N GONORRHOEA RRNA SPEC QL NAA+PROBE: SIGNIFICANT CHANGE UP
SPECIMEN SOURCE: SIGNIFICANT CHANGE UP

## 2021-07-16 LAB — CYTOLOGY SPEC DOC CYTO: SIGNIFICANT CHANGE UP

## 2022-11-03 NOTE — END OF VISIT
[FreeTextEntry3] : I have discussed the case with practitioner and directly  supervised face to face the key elements of the encounter\par 
None

## 2023-06-29 ENCOUNTER — OUTPATIENT (OUTPATIENT)
Dept: OUTPATIENT SERVICES | Facility: HOSPITAL | Age: 38
LOS: 1 days | End: 2023-06-29
Payer: MEDICAID

## 2023-06-29 ENCOUNTER — APPOINTMENT (OUTPATIENT)
Dept: INTERNAL MEDICINE | Facility: CLINIC | Age: 38
End: 2023-06-29
Payer: MEDICAID

## 2023-06-29 VITALS
BODY MASS INDEX: 35.34 KG/M2 | HEIGHT: 60 IN | HEART RATE: 60 BPM | DIASTOLIC BLOOD PRESSURE: 72 MMHG | WEIGHT: 180 LBS | SYSTOLIC BLOOD PRESSURE: 130 MMHG | OXYGEN SATURATION: 98 %

## 2023-06-29 DIAGNOSIS — Z00.00 ENCOUNTER FOR GENERAL ADULT MEDICAL EXAMINATION W/OUT ABNORMAL FINDINGS: ICD-10-CM

## 2023-06-29 DIAGNOSIS — R07.81 PLEURODYNIA: ICD-10-CM

## 2023-06-29 DIAGNOSIS — R73.03 PREDIABETES: ICD-10-CM

## 2023-06-29 DIAGNOSIS — B35.1 TINEA UNGUIUM: ICD-10-CM

## 2023-06-29 DIAGNOSIS — R73.03 PREDIABETES.: ICD-10-CM

## 2023-06-29 DIAGNOSIS — I10 ESSENTIAL (PRIMARY) HYPERTENSION: ICD-10-CM

## 2023-06-29 PROCEDURE — G0463: CPT

## 2023-06-29 PROCEDURE — 80053 COMPREHEN METABOLIC PANEL: CPT

## 2023-06-29 PROCEDURE — 83036 HEMOGLOBIN GLYCOSYLATED A1C: CPT

## 2023-06-29 PROCEDURE — 80061 LIPID PANEL: CPT

## 2023-06-29 PROCEDURE — 99213 OFFICE O/P EST LOW 20 MIN: CPT | Mod: GE

## 2023-06-29 PROCEDURE — 85025 COMPLETE CBC W/AUTO DIFF WBC: CPT

## 2023-06-29 RX ORDER — UREA 450 MG/ML
45 GEL TOPICAL TWICE DAILY
Qty: 1 | Refills: 0 | Status: ACTIVE | COMMUNITY
Start: 2023-06-29 | End: 1900-01-01

## 2023-06-29 RX ORDER — FLUTICASONE PROPIONATE 50 UG/1
50 SPRAY, METERED NASAL DAILY
Qty: 1 | Refills: 2 | Status: COMPLETED | COMMUNITY
Start: 2020-01-29 | End: 2023-06-29

## 2023-06-29 RX ORDER — ELECTROLYTES/DEXTROSE
SOLUTION, ORAL ORAL DAILY
Qty: 90 | Refills: 3 | Status: COMPLETED | COMMUNITY
Start: 2017-08-17 | End: 2023-06-29

## 2023-06-29 RX ORDER — AMOXICILLIN 500 MG/1
500 TABLET, FILM COATED ORAL
Qty: 2 | Refills: 0 | Status: COMPLETED | COMMUNITY
Start: 2020-03-02 | End: 2020-06-29

## 2023-06-29 RX ORDER — CICLOPIROX 80 MG/ML
8 SOLUTION TOPICAL
Qty: 1 | Refills: 3 | Status: ACTIVE | COMMUNITY
Start: 2023-06-29 | End: 1900-01-01

## 2023-06-29 NOTE — HISTORY OF PRESENT ILLNESS
[FreeTextEntry8] : 36 y/o F with PMH of prediabetes, MIGUEL, B12 deficiency, sulfa allergy, allergic rhinitis, AUB s/p abdominal supracervical hysterectomy, and sacral colpopexy (2019) presenting with b/l foot pain for the past several months.\par \par Pt reports she has had pain in her big toes for the past few months, has had discoloration for a while, feels like nail is coming off the bed and has extra pain when air enter the area. Works as a  in a [employer], works on her feet all day, ~12-16 hour shifts. She denies numbness in the area, no numbness in the soles of the feet. Pt also has a callous on her R pinky toe, has tried shaving it down in the past but went too deep and it started to hurt and bleed. Callous eventually grew back. Does not use insoles or any offloading but tries to stay active. Denied fever, chills, CP, SOB, nausea, vomiting, diarrhea, constipation,dysuria.

## 2023-06-29 NOTE — HEALTH RISK ASSESSMENT
[No] : In the past 12 months have you used drugs other than those required for medical reasons? No [No falls in past year] : Patient reported no falls in the past year [0] : 2) Feeling down, depressed, or hopeless: Not at all (0) [PHQ-2 Negative - No further assessment needed] : PHQ-2 Negative - No further assessment needed [Never] : Never [[id number]] : 0

## 2023-06-30 ENCOUNTER — NON-APPOINTMENT (OUTPATIENT)
Age: 38
End: 2023-06-30

## 2023-06-30 LAB
ALBUMIN SERPL ELPH-MCNC: 4.2 G/DL
ALP BLD-CCNC: 45 U/L
ALT SERPL-CCNC: 14 U/L
ANION GAP SERPL CALC-SCNC: 15 MMOL/L
AST SERPL-CCNC: 14 U/L
BASOPHILS # BLD AUTO: 0.05 K/UL
BASOPHILS NFR BLD AUTO: 0.6 %
BILIRUB SERPL-MCNC: 0.2 MG/DL
BUN SERPL-MCNC: 21 MG/DL
CALCIUM SERPL-MCNC: 9.1 MG/DL
CHLORIDE SERPL-SCNC: 106 MMOL/L
CHOLEST SERPL-MCNC: 186 MG/DL
CO2 SERPL-SCNC: 20 MMOL/L
CREAT SERPL-MCNC: 0.89 MG/DL
EGFR: 85 ML/MIN/1.73M2
EOSINOPHIL # BLD AUTO: 0.09 K/UL
EOSINOPHIL NFR BLD AUTO: 1.1 %
ESTIMATED AVERAGE GLUCOSE: 114 MG/DL
GLUCOSE SERPL-MCNC: 67 MG/DL
HBA1C MFR BLD HPLC: 5.6 %
HCT VFR BLD CALC: 41 %
HDLC SERPL-MCNC: 62 MG/DL
HGB BLD-MCNC: 13.2 G/DL
IMM GRANULOCYTES NFR BLD AUTO: 0.4 %
LDLC SERPL CALC-MCNC: 106 MG/DL
LYMPHOCYTES # BLD AUTO: 2.09 K/UL
LYMPHOCYTES NFR BLD AUTO: 24.6 %
MAN DIFF?: NORMAL
MCHC RBC-ENTMCNC: 27.7 PG
MCHC RBC-ENTMCNC: 32.2 GM/DL
MCV RBC AUTO: 86.1 FL
MONOCYTES # BLD AUTO: 0.83 K/UL
MONOCYTES NFR BLD AUTO: 9.8 %
NEUTROPHILS # BLD AUTO: 5.42 K/UL
NEUTROPHILS NFR BLD AUTO: 63.5 %
NONHDLC SERPL-MCNC: 123 MG/DL
PLATELET # BLD AUTO: 248 K/UL
POTASSIUM SERPL-SCNC: 4.2 MMOL/L
PROT SERPL-MCNC: 6.6 G/DL
RBC # BLD: 4.76 M/UL
RBC # FLD: 14.5 %
SODIUM SERPL-SCNC: 140 MMOL/L
TRIGL SERPL-MCNC: 88 MG/DL
WBC # FLD AUTO: 8.51 K/UL

## 2023-07-10 ENCOUNTER — APPOINTMENT (OUTPATIENT)
Dept: PODIATRY | Facility: HOSPITAL | Age: 38
End: 2023-07-10
Payer: MEDICAID

## 2023-07-10 ENCOUNTER — OUTPATIENT (OUTPATIENT)
Dept: OUTPATIENT SERVICES | Facility: HOSPITAL | Age: 38
LOS: 1 days | End: 2023-07-10
Payer: MEDICAID

## 2023-07-10 VITALS
WEIGHT: 180 LBS | DIASTOLIC BLOOD PRESSURE: 83 MMHG | HEART RATE: 69 BPM | BODY MASS INDEX: 35.34 KG/M2 | TEMPERATURE: 98 F | HEIGHT: 60 IN | RESPIRATION RATE: 16 BRPM | SYSTOLIC BLOOD PRESSURE: 123 MMHG

## 2023-07-10 DIAGNOSIS — M79.671 PAIN IN RIGHT FOOT: ICD-10-CM

## 2023-07-10 DIAGNOSIS — B35.3 TINEA PEDIS: ICD-10-CM

## 2023-07-10 DIAGNOSIS — M79.609 PAIN IN UNSPECIFIED LIMB: ICD-10-CM

## 2023-07-10 DIAGNOSIS — M79.672 PAIN IN RIGHT FOOT: ICD-10-CM

## 2023-07-10 PROCEDURE — 99212 OFFICE O/P EST SF 10 MIN: CPT | Mod: 25

## 2023-07-10 PROCEDURE — G0463: CPT

## 2023-07-10 PROCEDURE — 11055 PARING/CUTG B9 HYPRKER LES 1: CPT

## 2023-07-10 RX ORDER — CLOTRIMAZOLE 10 MG/G
1 CREAM TOPICAL
Qty: 1 | Refills: 1 | Status: ACTIVE | COMMUNITY
Start: 2023-07-10 | End: 1900-01-01

## 2023-07-10 RX ORDER — LIDOCAINE 5% 700 MG/1
5 PATCH TOPICAL
Qty: 30 | Refills: 0 | Status: DISCONTINUED | COMMUNITY
Start: 2023-06-29 | End: 2023-07-10

## 2023-07-10 NOTE — ASSESSMENT
[FreeTextEntry1] : 37F presents to clinic with bilateral 5th digit pain\par - Pt seen and evaluated\par - Bilateral 5th digit hyperkeratosis, no open wounds or lesions, no clinical signs of infection, plantar annular scaling\par - Obtained consent from patient for excisional debridement\par - Aseptic excisional debridement of lateral 5th digit hyperkeratosis using a sterile #15 blade; pt tolerated procedure well\par - Rx: clotrimazole for tinea pedis\par - Ordered bilateral foot X-rays\par \par - RTC 3 weeks to evaluate X-rays and tx plan

## 2023-07-10 NOTE — PHYSICAL EXAM
[2+] : left foot dorsalis pedis 2+ [No Joint Swelling] : no joint swelling [Skin Color & Pigmentation] : normal skin color and pigmentation [] : no rash [Ankle Swelling (On Exam)] : not present [FreeTextEntry3] : Pedal hair present b/l [de-identified] : Moderate pain on palpation of the lateral 5th digit b/l. [Foot Ulcer] : no foot ulcer [Skin Induration] : no skin induration [FreeTextEntry1] : Sensation intact at the level of the digits

## 2023-07-10 NOTE — HISTORY OF PRESENT ILLNESS
[FreeTextEntry1] : 37F presents with b/l 5th digit pain. Patient states that she noticed a callous form about 1 year ago. Since then, she has tried to apply OTC cream and attempted to remove the callous using a nail clipper. Patient admits to temporary relief but then the pain comes back. Pt states that she has most pain when ambulating in shoes. Pt states that she has tried OTC pain medication with no relief. Patient denies n/v/f/chills/cough.

## 2023-07-25 ENCOUNTER — MED ADMIN CHARGE (OUTPATIENT)
Age: 38
End: 2023-07-25

## 2023-07-25 ENCOUNTER — LABORATORY RESULT (OUTPATIENT)
Age: 38
End: 2023-07-25

## 2023-07-25 ENCOUNTER — OUTPATIENT (OUTPATIENT)
Dept: OUTPATIENT SERVICES | Facility: HOSPITAL | Age: 38
LOS: 1 days | End: 2023-07-25
Payer: MEDICAID

## 2023-07-25 ENCOUNTER — APPOINTMENT (OUTPATIENT)
Dept: OBGYN | Facility: CLINIC | Age: 38
End: 2023-07-25
Payer: MEDICAID

## 2023-07-25 VITALS — SYSTOLIC BLOOD PRESSURE: 128 MMHG | BODY MASS INDEX: 34.57 KG/M2 | DIASTOLIC BLOOD PRESSURE: 80 MMHG | WEIGHT: 177 LBS

## 2023-07-25 DIAGNOSIS — Z01.419 ENCOUNTER FOR GYNECOLOGICAL EXAMINATION (GENERAL) (ROUTINE) W/OUT ABNORMAL FINDINGS: ICD-10-CM

## 2023-07-25 DIAGNOSIS — N76.0 ACUTE VAGINITIS: ICD-10-CM

## 2023-07-25 DIAGNOSIS — R92.2 INCONCLUSIVE MAMMOGRAM: ICD-10-CM

## 2023-07-25 DIAGNOSIS — Z11.3 ENCOUNTER FOR SCREENING FOR INFECTIONS WITH A PREDOMINANTLY SEXUAL MODE OF TRANSMISSION: ICD-10-CM

## 2023-07-25 LAB — HIV 1+2 AB+HIV1 P24 AG SERPL QL IA: SIGNIFICANT CHANGE UP

## 2023-07-25 PROCEDURE — 81025 URINE PREGNANCY TEST: CPT

## 2023-07-25 PROCEDURE — 87491 CHLMYD TRACH DNA AMP PROBE: CPT

## 2023-07-25 PROCEDURE — 87340 HEPATITIS B SURFACE AG IA: CPT

## 2023-07-25 PROCEDURE — 36415 COLL VENOUS BLD VENIPUNCTURE: CPT

## 2023-07-25 PROCEDURE — 86780 TREPONEMA PALLIDUM: CPT

## 2023-07-25 PROCEDURE — G0463: CPT

## 2023-07-25 PROCEDURE — 86803 HEPATITIS C AB TEST: CPT

## 2023-07-25 PROCEDURE — 87591 N.GONORRHOEAE DNA AMP PROB: CPT

## 2023-07-25 PROCEDURE — 90471 IMMUNIZATION ADMIN: CPT | Mod: NC

## 2023-07-25 PROCEDURE — 90651 9VHPV VACCINE 2/3 DOSE IM: CPT

## 2023-07-25 PROCEDURE — 87389 HIV-1 AG W/HIV-1&-2 AB AG IA: CPT

## 2023-07-25 PROCEDURE — 81003 URINALYSIS AUTO W/O SCOPE: CPT

## 2023-07-25 PROCEDURE — 90471 IMMUNIZATION ADMIN: CPT

## 2023-07-25 PROCEDURE — 87624 HPV HI-RISK TYP POOLED RSLT: CPT

## 2023-07-25 PROCEDURE — 99395 PREV VISIT EST AGE 18-39: CPT | Mod: 25

## 2023-07-25 NOTE — HISTORY OF PRESENT ILLNESS
[FreeTextEntry1] : 38yo P5 s/p DUSTIN/ASC/BS presents for annual gyn exam.  Pt reports she has seen scant spotting sporadically since her hysterectomy.  Mian vaginal pain/ irritation.   Pt currently not sex active.  Desires STD screen.  \par \par - PAP 7/2021 neg/ HPV neg\par - Gardasil- never, desires\par - Contraception- s/p DUSTIN\par \par Gen health followed by medicine

## 2023-07-25 NOTE — COUNSELING
[Breast Self Exam] : breast self exam [STD (testing, results, tx)] : STD (testing, results, tx) [HPV Vaccine] : HPV Vaccine

## 2023-07-25 NOTE — DISCUSSION/SUMMARY
[FreeTextEntry1] : 38yo P5 (h/o DUSTIN/Ant repair/BS)- for annual gyn exam\par \par # Spotting?\par - exam unremarkable- no evidence of vaginitis\par - [ ]gc/ct sent\par - [ ]pap/ hpv sent\par - asked pt to keep log, rto with spotting if possible\par - discussed possible residual endometrial cells in upper cervix\par \par # HCM\par - [ ]pap/ hpv collected\par - [ ] mammo/ br sono @39yo - rx given\par - [ ]susana #1 today\par - [ ]STD panel drawn\par - gen health followed by medicine\par \par RTC 2 months for susana #2 and to see if any further spotting\par Stefania Wright, PAC

## 2023-07-25 NOTE — PHYSICAL EXAM
[Appropriately responsive] : appropriately responsive [Alert] : alert [No Acute Distress] : no acute distress [No Lymphadenopathy] : no lymphadenopathy [No Murmurs] : no murmurs [Soft] : soft [Non-tender] : non-tender [Non-distended] : non-distended [No HSM] : No HSM [No Lesions] : no lesions [No Mass] : no mass [Oriented x3] : oriented x3 [Examination Of The Breasts] : a normal appearance [No Masses] : no breast masses were palpable [Labia Majora] : normal [Labia Minora] : normal [Pink Rugae] : pink rugae [No Bleeding] : There was no active vaginal bleeding [Normal] : normal [Absent] : absent [Uterine Adnexae] : normal

## 2023-07-26 LAB
C TRACH RRNA SPEC QL NAA+PROBE: SIGNIFICANT CHANGE UP
HBV SURFACE AG SER-ACNC: SIGNIFICANT CHANGE UP
HCV AB S/CO SERPL IA: 0.12 S/CO — SIGNIFICANT CHANGE UP (ref 0–0.99)
HCV AB SERPL-IMP: SIGNIFICANT CHANGE UP
HPV HIGH+LOW RISK DNA PNL CVX: SIGNIFICANT CHANGE UP
N GONORRHOEA RRNA SPEC QL NAA+PROBE: SIGNIFICANT CHANGE UP
SPECIMEN SOURCE: SIGNIFICANT CHANGE UP
T PALLIDUM AB TITR SER: NEGATIVE — SIGNIFICANT CHANGE UP

## 2023-07-27 LAB — CYTOLOGY SPEC DOC CYTO: SIGNIFICANT CHANGE UP

## 2023-07-31 ENCOUNTER — APPOINTMENT (OUTPATIENT)
Dept: PODIATRY | Facility: HOSPITAL | Age: 38
End: 2023-07-31

## 2023-08-08 DIAGNOSIS — Z11.3 ENCOUNTER FOR SCREENING FOR INFECTIONS WITH A PREDOMINANTLY SEXUAL MODE OF TRANSMISSION: ICD-10-CM

## 2023-08-08 DIAGNOSIS — Z23 ENCOUNTER FOR IMMUNIZATION: ICD-10-CM

## 2023-08-08 DIAGNOSIS — Z01.419 ENCOUNTER FOR GYNECOLOGICAL EXAMINATION (GENERAL) (ROUTINE) WITHOUT ABNORMAL FINDINGS: ICD-10-CM

## 2023-08-08 DIAGNOSIS — R92.2 INCONCLUSIVE MAMMOGRAM: ICD-10-CM

## 2023-09-25 ENCOUNTER — OUTPATIENT (OUTPATIENT)
Dept: OUTPATIENT SERVICES | Facility: HOSPITAL | Age: 38
LOS: 1 days | End: 2023-09-25
Payer: MEDICAID

## 2023-09-25 ENCOUNTER — APPOINTMENT (OUTPATIENT)
Dept: OBGYN | Facility: CLINIC | Age: 38
End: 2023-09-25
Payer: MEDICAID

## 2023-09-25 DIAGNOSIS — Z23 ENCOUNTER FOR IMMUNIZATION: ICD-10-CM

## 2023-09-25 DIAGNOSIS — N76.0 ACUTE VAGINITIS: ICD-10-CM

## 2023-09-25 LAB
HCG UR QL: NEGATIVE
QUALITY CONTROL: YES

## 2023-09-25 PROCEDURE — 90471 IMMUNIZATION ADMIN: CPT | Mod: NC

## 2023-09-25 PROCEDURE — 81025 URINE PREGNANCY TEST: CPT

## 2023-09-25 PROCEDURE — 90471 IMMUNIZATION ADMIN: CPT

## 2023-09-25 PROCEDURE — 90651 9VHPV VACCINE 2/3 DOSE IM: CPT

## 2023-10-06 DIAGNOSIS — Z23 ENCOUNTER FOR IMMUNIZATION: ICD-10-CM

## 2023-10-06 DIAGNOSIS — Z71.85 ENCOUNTER FOR IMMUNIZATION SAFETY COUNSELING: ICD-10-CM

## 2024-02-12 DIAGNOSIS — N89.8 OTHER SPECIFIED NONINFLAMMATORY DISORDERS OF VAGINA: ICD-10-CM

## 2024-02-12 RX ORDER — CLOTRIMAZOLE AND BETAMETHASONE DIPROPIONATE 10; .5 MG/G; MG/G
1-0.05 CREAM TOPICAL TWICE DAILY
Qty: 1 | Refills: 3 | Status: ACTIVE | COMMUNITY
Start: 2024-02-12 | End: 1900-01-01

## 2024-02-15 ENCOUNTER — APPOINTMENT (OUTPATIENT)
Dept: OBGYN | Facility: CLINIC | Age: 39
End: 2024-02-15

## 2025-04-23 ENCOUNTER — APPOINTMENT (OUTPATIENT)
Dept: INTERNAL MEDICINE | Facility: CLINIC | Age: 40
End: 2025-04-23

## 2025-05-05 ENCOUNTER — APPOINTMENT (OUTPATIENT)
Dept: OBGYN | Facility: CLINIC | Age: 40
End: 2025-05-05

## 2025-05-07 ENCOUNTER — APPOINTMENT (OUTPATIENT)
Dept: INTERNAL MEDICINE | Facility: CLINIC | Age: 40
End: 2025-05-07
Payer: MEDICAID

## 2025-05-07 ENCOUNTER — OUTPATIENT (OUTPATIENT)
Dept: OUTPATIENT SERVICES | Facility: HOSPITAL | Age: 40
LOS: 1 days | End: 2025-05-07
Payer: MEDICAID

## 2025-05-07 VITALS
HEIGHT: 60 IN | BODY MASS INDEX: 34.55 KG/M2 | OXYGEN SATURATION: 97 % | HEART RATE: 91 BPM | DIASTOLIC BLOOD PRESSURE: 86 MMHG | SYSTOLIC BLOOD PRESSURE: 120 MMHG | WEIGHT: 176 LBS

## 2025-05-07 DIAGNOSIS — R55 SYNCOPE AND COLLAPSE: ICD-10-CM

## 2025-05-07 DIAGNOSIS — I10 ESSENTIAL (PRIMARY) HYPERTENSION: ICD-10-CM

## 2025-05-07 PROCEDURE — 99213 OFFICE O/P EST LOW 20 MIN: CPT | Mod: GE

## 2025-05-07 PROCEDURE — 85027 COMPLETE CBC AUTOMATED: CPT

## 2025-05-07 PROCEDURE — 84443 ASSAY THYROID STIM HORMONE: CPT

## 2025-05-07 PROCEDURE — G2211 COMPLEX E/M VISIT ADD ON: CPT | Mod: NC

## 2025-05-07 PROCEDURE — 80048 BASIC METABOLIC PNL TOTAL CA: CPT

## 2025-05-07 PROCEDURE — G0463: CPT

## 2025-05-08 LAB
ANION GAP SERPL CALC-SCNC: 17 MMOL/L
BUN SERPL-MCNC: 14 MG/DL
CALCIUM SERPL-MCNC: 9.1 MG/DL
CHLORIDE SERPL-SCNC: 106 MMOL/L
CO2 SERPL-SCNC: 18 MMOL/L
CREAT SERPL-MCNC: 0.68 MG/DL
EGFRCR SERPLBLD CKD-EPI 2021: 112 ML/MIN/1.73M2
GLUCOSE SERPL-MCNC: 92 MG/DL
HCT VFR BLD CALC: 45.5 %
HGB BLD-MCNC: 14.6 G/DL
MCHC RBC-ENTMCNC: 28.2 PG
MCHC RBC-ENTMCNC: 32.1 G/DL
MCV RBC AUTO: 87.8 FL
PLATELET # BLD AUTO: 360 K/UL
POTASSIUM SERPL-SCNC: 4.3 MMOL/L
RBC # BLD: 5.18 M/UL
RBC # FLD: 12.7 %
SODIUM SERPL-SCNC: 142 MMOL/L
TSH SERPL-ACNC: 1.69 UIU/ML
WBC # FLD AUTO: 7.45 K/UL

## 2025-05-19 DIAGNOSIS — R55 SYNCOPE AND COLLAPSE: ICD-10-CM
